# Patient Record
Sex: MALE | Race: WHITE | NOT HISPANIC OR LATINO | Employment: OTHER | URBAN - METROPOLITAN AREA
[De-identification: names, ages, dates, MRNs, and addresses within clinical notes are randomized per-mention and may not be internally consistent; named-entity substitution may affect disease eponyms.]

---

## 2017-04-25 ENCOUNTER — SLEEP CENTER VISIT (OUTPATIENT)
Dept: SLEEP MEDICINE | Facility: MEDICAL CENTER | Age: 72
End: 2017-04-25
Payer: MEDICARE

## 2017-04-25 VITALS
OXYGEN SATURATION: 92 % | SYSTOLIC BLOOD PRESSURE: 128 MMHG | WEIGHT: 295 LBS | BODY MASS INDEX: 44.71 KG/M2 | HEART RATE: 97 BPM | DIASTOLIC BLOOD PRESSURE: 70 MMHG | RESPIRATION RATE: 18 BRPM | HEIGHT: 68 IN

## 2017-04-25 DIAGNOSIS — G47.33 OBSTRUCTIVE SLEEP APNEA: ICD-10-CM

## 2017-04-25 PROCEDURE — 99213 OFFICE O/P EST LOW 20 MIN: CPT | Performed by: INTERNAL MEDICINE

## 2017-04-25 NOTE — PATIENT INSTRUCTIONS
1.  Recommend continuing CPAP at 15 cm water  2. Recommend follow-up in one year  3. Please call when ready to come in for repeat sleep study and we will make the arrangements.

## 2017-04-25 NOTE — MR AVS SNAPSHOT
"        George Stoll   2017 2:40 PM   Sleep Center Visit   MRN: [id number]    Department:  [facility]   Dept Phone:  [phone]    Description:  Male : 1945   Provider:  Denis Manning M.D.           Reason for Visit     Follow-Up GERI      Allergies as of 2017     Allergen Noted Reactions    Sulfa Drugs 2012       Thiazide [Hydrochlorothiazide W-Triamterene] 2012         You were diagnosed with     Obstructive sleep apnea   [999721]         Vital Signs     Blood Pressure Pulse Respirations Height Weight Body Mass Index    128/70 mmHg 97 18 1.727 m (5' 7.99\") 133.811 kg (295 lb) 44.86 kg/m2    Oxygen Saturation Smoking Status                92% Former Smoker          Basic Information     Date Of Birth Sex Race Ethnicity Preferred Language    1945 Male White Non- English      Problem List              ICD-10-CM Priority Class Noted - Resolved    GERI (obstructive sleep apnea) G47.33   2016 - Present    Obesity E66.9   2016 - Present    Hypertension I10   2016 - Present      Health Maintenance        Date Due Completion Dates    IMM DTaP/Tdap/Td Vaccine (1 - Tdap) 3/4/1966 ---    COLONOSCOPY 3/4/1997 ---    IMM ZOSTER VACCINE 3/4/2007 ---    IMM PNEUMOCOCCAL 65+ (ADULT) LOW/MEDIUM RISK SERIES (1 of 2 - PCV13) 3/4/2012 ---            Current Immunizations     Influenza Vaccine Adult HD 2016 11:23 AM, 10/13/2015 11:37 AM      Below and/or attached are the medications your provider expects you to take. Review all of your home medications and newly ordered medications with your provider and/or pharmacist. Follow medication instructions as directed by your provider and/or pharmacist. Please keep your medication list with you and share with your provider. Update the information when medications are discontinued, doses are changed, or new medications (including over-the-counter products) are added; and carry medication information at all times in the " event of emergency situations     Allergies:  SULFA DRUGS - (reactions not documented)     THIAZIDE - (reactions not documented)               Medications  Valid as of: April 25, 2017 -  2:55 PM    Generic Name Brand Name Tablet Size Instructions for use    AmLODIPine Besylate (Tab) NORVASC 5 MG Take 5 mg by mouth every day.        AmLODIPine Besylate (Tab) NORVASC 5 MG Take 1 Tab by mouth every day.        Aspirin (Tab)  MG Take 325 mg by mouth every day.        Doxazosin Mesylate (Tab) CARDURA 2 MG Take 2 mg by mouth every day.        Gabapentin (Cap) NEURONTIN 300 MG Take 300 mg by mouth 3 times a day.        Omega-3 Fatty Acids   Take 1,400 mg by mouth every day.        Omeprazole (CAPSULE DELAYED RELEASE) PRILOSEC 20 MG Take 20 mg by mouth every day.        Zinc (Tab) Zinc 50 MG Take  by mouth every day.        .                 Medicines prescribed today were sent to:     [facility] [id number] [place] [address], [place] - [address] AT [facility] [address] [facility]    [address] [facility] [address]    Phone: [phone] Fax: [phone]    Open 24 Hours?: No      Medication refill instructions:       If your prescription bottle indicates you have medication refills left, it is not necessary to call your provider’s office. Please contact your pharmacy and they will refill your medication.    If your prescription bottle indicates you do not have any refills left, you may request refills at any time through one of the following ways: The online LookIt system (except Urgent Care), by calling your provider’s office, or by asking your pharmacy to contact your provider’s office with a refill request. Medication refills are processed only during regular business hours and may not be available until the next business day. Your provider may request additional information or to have a follow-up visit with you prior to refilling your medication.   *Please Note: Medication refills are assigned a new Rx  number when refilled electronically. Your pharmacy may indicate that no refills were authorized even though a new prescription for the same medication is available at the pharmacy. Please request the medicine by name with the pharmacy before contacting your provider for a refill.           Weotta Access Code: [email or web address]  Expires: 5/25/2017  2:30 PM    Weotta  A secure, online tool to manage your health information     [employer]’s Weotta® is a secure, online tool that connects you to your personalized health information from the privacy of your home -- day or night - making it very easy for you to manage your healthcare. Once the activation process is completed, you can even access your medical information using the Weotta melva, which is available for free in the Apple Melva store or Google Play store.     Weotta provides the following levels of access (as shown below):   My Chart Features   [facility] Doctor [facility]  Specialists [facility]  Urgent  Care [facility]  Primary Care  Doctor   Email your healthcare team securely and privately 24/7 X X X    Manage appointments: schedule your next appointment; view details of past/upcoming appointments X      Request prescription refills. X      View recent personal medical records, including lab and immunizations X X X X   View health record, including health history, allergies, medications X X X X   Read reports about your outpatient visits, procedures, consult and ER notes X X X X   See your discharge summary, which is a recap of your hospital and/or ER visit that includes your diagnosis, lab results, and care plan. X X       How to register for Weotta:  1. Go to  [email or web address].  2. Click on the Sign Up Now box, which takes you to the New Member Sign Up page. You will need to provide the following information:  a. Enter your Weotta Access Code exactly as it appears at the top of this page. (You will not need to use this code after you’ve  completed the sign-up process. If you do not sign up before the expiration date, you must request a new code.)   b. Enter your date of birth.   c. Enter your home email address.   d. Click Submit, and follow the next screen’s instructions.  3. Create a Womplyt ID. This will be your Womplyt login ID and cannot be changed, so think of one that is secure and easy to remember.  4. Create a JumpHawk password. You can change your password at any time.  5. Enter your Password Reset Question and Answer. This can be used at a later time if you forget your password.   6. Enter your e-mail address. This allows you to receive e-mail notifications when new information is available in JumpHawk.  7. Click Sign Up. You can now view your health information.    For assistance activating your JumpHawk account, call [phone]

## 2017-04-25 NOTE — PROGRESS NOTES
George Stoll is a 72 y.o. male here for obstructive sleep apnea.    History of Present Illness:    The patient is a 72-year-old male who has obstructive sleep apnea. He says his last sleep study was about 12 years ago. Nose in 2015. The apnea hypopnea index of 17 and an hour with a low oxygen saturation of 72%. He was originally on 13 cm water now is up to 15 cm of water. He brings in his data card. He feels like he is sleeping well however he is waking up more during the night and he was in the past. He wants to do another sleep study but he just had knee surgery and he wants to wait until his knees have recovered. The data card indicates an average usage of 8 hours a night and an apnea hypopnea index of 0.4 per hour. Otherwise he has no new complaints today.    Constitutional:  Negative for fever, chills, sweats, and fatigue.  Eyes:  Negative for eye pain and visual changes.  HENT:  Negative for tinnitus and hoarse voice.  Cardiovascular:  Negative for chest pain, leg swelling, syncope and orthopnea.  Respiratory:  See HPI for pertinent negatives  Sleep:  Negative for somnolence, loud snoring, sleep disturbance due to breathing, insomnia.  Gastrointestinal:  Negative for dysphagia, nausea and abdominal pain.  Heme/lymph:  Denies easy bruising, blood clots.  Musculoskeletal:  Negative for arthralgias, sore muscles and back pain.  Skin:  Negative for rash and color change.  Neurological:  Negative for headaches, lightheadedness and weakness.  Psychiatric:  Denies depression.    Current Outpatient Prescriptions   Medication Sig Dispense Refill   • gabapentin (NEURONTIN) 300 MG Cap Take 300 mg by mouth 3 times a day.     • omeprazole (PRILOSEC) 20 MG CPDR Take 20 mg by mouth every day.     • doxazosin (CARDURA) 2 MG TABS Take 2 mg by mouth every day.     • Zinc 50 MG TABS Take  by mouth every day.     • Omega-3 Fatty Acids (OMEGA 3 PO) Take 1,400 mg by mouth every day.     • aspirin (ASA) 325 MG TABS Take 325 mg  "by mouth every day.     • amlodipine (NORVASC) 5 MG TABS Take 1 Tab by mouth every day. 90 Each 3   • amlodipine (NORVASC) 5 MG TABS Take 5 mg by mouth every day.       No current facility-administered medications for this visit.       Social History   Substance Use Topics   • Smoking status: Former Smoker -- 0.25 packs/day for 2 years   • Smokeless tobacco: Never Used   • Alcohol Use: No       Past Medical History   Diagnosis Date   • Obesity    • Hypertension    • Sleep apnea        Past Surgical History   Procedure Laterality Date   • Uvulopharyngopalatoplasty     • Tonsillectomy     • Pr remv 2nd cataract,corn-scler sectn     • Arthroscopy, knee         Allergies:  Sulfa drugs and Thiazide    Family History   Problem Relation Age of Onset   • Heart Disease Mother    • Other Brother        Physical Examination    Filed Vitals:    04/25/17 1436   Height: 1.727 m (5' 7.99\")   Weight: 133.811 kg (295 lb)   Weight % change since last entry.: 0 %   BP: 128/70   Pulse: 97   BMI (Calculated): 44.87   Resp: 18       Physical Exam:  Constitutional:  Well developed and well nourished.  Head:  Normocephalic and atraumatic.  Nose:  Nose normal.  Mouth/Throat:  Oropharynx is clear and moist, no lesions.    Neck:  Normal range of motion.  Supple.  No JVD.  Cardiovascular:  Normal rate, regular rhythm, normal heart sounds. No edema  Pulmonary/Chest: No wheezing, rales or rhonchi.  Respiratory effort non labored  Musculoskeletal.  No muscular atrophy.  Lymphadenopathy:  No cervical or supraclavicular adenopathy  Neurological:  Alert and oriented.  Cranial nerves intact.  No focal deficits  Skin:  No rashes or ulcers.  Psyciatric:  Normal mood and affect.    Assessment and Plan:  1. Obstructive sleep apnea  For now the patient will continue with CPAP of 15 cm of water. He is very compliant and per the data card it appears to be efficacious. However the patient wants to come in for a CPAP titration reevaluation study. His last " sleep study was over 12 years ago.  He just wants to do it after his knees have recovered from surgery. He is to give us a call when he is ready to do the CPAP titration. I have put an order in for the study which can be used any calls in to have it scheduled. We will see him back after the sleep study or we'll see him back in one year.      Followup Return in about 1 year (around 4/25/2018) for follow up with the sleep physician, follow up visit with KARLI.

## 2017-05-24 ENCOUNTER — TELEPHONE (OUTPATIENT)
Dept: PULMONOLOGY | Facility: HOSPICE | Age: 72
End: 2017-05-24

## 2017-05-24 DIAGNOSIS — G47.33 OBSTRUCTIVE SLEEP APNEA: ICD-10-CM

## 2018-07-19 ENCOUNTER — SLEEP CENTER VISIT (OUTPATIENT)
Dept: SLEEP MEDICINE | Facility: MEDICAL CENTER | Age: 73
End: 2018-07-19
Payer: MEDICARE

## 2018-07-19 VITALS
WEIGHT: 295 LBS | RESPIRATION RATE: 15 BRPM | SYSTOLIC BLOOD PRESSURE: 118 MMHG | BODY MASS INDEX: 46.3 KG/M2 | DIASTOLIC BLOOD PRESSURE: 74 MMHG | OXYGEN SATURATION: 93 % | HEIGHT: 67 IN | HEART RATE: 85 BPM

## 2018-07-19 DIAGNOSIS — Z87.891 FORMER SMOKER: ICD-10-CM

## 2018-07-19 DIAGNOSIS — G47.33 OSA (OBSTRUCTIVE SLEEP APNEA): ICD-10-CM

## 2018-07-19 DIAGNOSIS — I10 ESSENTIAL HYPERTENSION: ICD-10-CM

## 2018-07-19 DIAGNOSIS — R53.83 FATIGUE, UNSPECIFIED TYPE: ICD-10-CM

## 2018-07-19 PROCEDURE — 99214 OFFICE O/P EST MOD 30 MIN: CPT | Performed by: NURSE PRACTITIONER

## 2018-07-19 ASSESSMENT — ENCOUNTER SYMPTOMS
NEUROLOGICAL NEGATIVE: 1
DIAPHORESIS: 0
MUSCULOSKELETAL NEGATIVE: 1
EYE DISCHARGE: 0
GASTROINTESTINAL NEGATIVE: 1
PSYCHIATRIC NEGATIVE: 1
WEAKNESS: 0
FEVER: 0
CHILLS: 0
EYE PAIN: 0
WEIGHT LOSS: 0
BRUISES/BLEEDS EASILY: 0
RESPIRATORY NEGATIVE: 1
CARDIOVASCULAR NEGATIVE: 1

## 2018-07-19 NOTE — PROGRESS NOTES
Chief Complaint   Patient presents with   • Follow-Up     Annual    • Apnea         HPI: This patient is a 73 y.o. male, who presents for annual follow-up of obstructive sleep apnea.    Patient was last seen April 2017 with Dr. Manning.  Patient has a long-standing history of sleep apnea initially diagnosed in 2005, RDI at that time was 17, minimum saturation 72%.  He has been can compliant with CPAP for many years.  He is currently using CPAP 15 cm H2O. He does not feel his machine is as effective as it once was.  His compliance report shows excellent use, 100% use over the past 30 days, average use 8 hours 41 minutes, AHI of 1.1.  He reports waking up feeling fatigued.  He says he is waking up several times per night occasionally experiences resuscitative gasps.  It is been 13 years since his last study, I did recommend split-night study for requalification purposes and to ensure pressure is adequately titrated.  He is amendable to this.  He denies other changes to his health since he was last seen.    He has a very brief and distant smoking history.    He has a history of hypertension, stable, he is currently asymptomatic denies possible cardiac complaints, remains compliant with Norvasc daily and daily aspirin 325 MG.    Past Medical History:   Diagnosis Date   • Hypertension    • Obesity    • Sleep apnea        Social History   Substance Use Topics   • Smoking status: Former Smoker     Packs/day: 0.25     Years: 2.00   • Smokeless tobacco: Never Used   • Alcohol use No       Family History   Problem Relation Age of Onset   • Heart Disease Mother    • Other Brother    • Sleep Apnea Brother        Immunization History   Administered Date(s) Administered   • Influenza Vaccine Adult HD 10/13/2015, 09/27/2016       Current medications as of today   Current Outpatient Prescriptions   Medication Sig Dispense Refill   • omeprazole (PRILOSEC) 20 MG CPDR Take 20 mg by mouth every day.     • Zinc 50 MG TABS Take  by mouth  "every day.     • aspirin (ASA) 325 MG TABS Take 325 mg by mouth every day.     • amlodipine (NORVASC) 5 MG TABS Take 1 Tab by mouth every day. 90 Each 3   • amlodipine (NORVASC) 5 MG TABS Take 5 mg by mouth every day.       No current facility-administered medications for this visit.        Allergies: Sulfa drugs and Thiazide [hydrochlorothiazide w-triamterene]    Blood pressure 118/74, pulse 85, resp. rate 15, height 1.702 m (5' 7\"), weight (!) 133.8 kg (295 lb), SpO2 93 %.      Review of Systems   Constitutional: Positive for malaise/fatigue. Negative for chills, diaphoresis, fever and weight loss.   HENT: Negative.    Eyes: Negative for pain and discharge.   Respiratory: Negative.    Cardiovascular: Negative.    Gastrointestinal: Negative.    Musculoskeletal: Negative.    Skin: Negative.    Neurological: Negative.  Negative for weakness.   Endo/Heme/Allergies: Negative for environmental allergies. Does not bruise/bleed easily.   Psychiatric/Behavioral: Negative.        Physical Exam   Constitutional: He is oriented to person, place, and time and well-developed, well-nourished, and in no distress.   HENT:   Head: Normocephalic and atraumatic.   Eyes: Pupils are equal, round, and reactive to light.   Neck: Normal range of motion. Neck supple. No tracheal deviation present.   Cardiovascular: Normal rate, regular rhythm and normal heart sounds.    Pulmonary/Chest: Effort normal and breath sounds normal.   Musculoskeletal: Normal range of motion.   Neurological: He is alert and oriented to person, place, and time. Gait normal.   Skin: Skin is warm and dry.   Psychiatric: Mood, memory, affect and judgment normal.   Vitals reviewed.      Diagnoses/Plan:      1. GERI (obstructive sleep apnea)  It has been 13 years since the patient's last sleep study.  He is amendable to split-night study.  He will require study for diagnostic purposes and repeat titration. We will try to complete this in one night. He declines ambien.  " He is due for new CPAP machine which can be ordered after his titration is completed.    - POLYSOMNOGRAPHY, 4 OR MORE; Future    2. Essential hypertension  Stable, patient is asymptomatic and compliant with amlodipine    3. Former smoker  Permanent and complete smoking cessation recommend    4. Fatigue, unspecified type  Possibly related to undertreated sleep apnea.  Should resolve once therapy is titrated.    Follow-up after sleep study to review results, sooner if needed    Follow-up with PCP for routine health maintenance

## 2018-08-02 ENCOUNTER — APPOINTMENT (OUTPATIENT)
Dept: SLEEP MEDICINE | Facility: MEDICAL CENTER | Age: 73
End: 2018-08-02
Attending: NURSE PRACTITIONER
Payer: MEDICARE

## 2018-08-12 ENCOUNTER — SLEEP STUDY (OUTPATIENT)
Dept: SLEEP MEDICINE | Facility: MEDICAL CENTER | Age: 73
End: 2018-08-12
Attending: NURSE PRACTITIONER
Payer: MEDICARE

## 2018-08-12 DIAGNOSIS — G47.33 OSA (OBSTRUCTIVE SLEEP APNEA): ICD-10-CM

## 2018-08-12 PROCEDURE — 95810 POLYSOM 6/> YRS 4/> PARAM: CPT | Performed by: FAMILY MEDICINE

## 2018-08-13 NOTE — PROCEDURES
Clinical Comments:  The patient underwent a comprehensive polysomnogram using the standard montage for measurement of parameters of sleep, respiratory events, movement abnormalities, heart rate and rhythm. A microphone was used to monitor snoring.      INTERPRETATION:  The total recording time was 157.1 minutes with a sleep period of N/A minutes and the total sleep time was 0.0 minutes with a sleep efficiency of 0.0%.  The sleep latency was N/A minutes, and REM latency was N/A minutes.  The patient experienced N/A arousals in total, for an arousal index of N/A    RESPIRATORY: The patient had N/A apneas in total.  Of these, N/A were obstructive apneas, and N/A were central apneas.  This resulted in an apnea index (AI) of N/A.  The patient had N/A hypopneas, for a hypopnea index of N/A.  The overall AHI was N/A, while the AHI during Stage R sleep was N/A.  AHI while supine was N/A.    OXIMETRY: Oxygen saturation monitoring showed a mean SpO2 of 81.8%, with a minimum oxygen saturation of N/A%.  Oxygen saturations were less than or = 89% for 0.0 minutes of sleep time.    CARDIAC: The highest heart rate during the recording was 250.0 beats per minute.  The average heart rate during sleep was N/A bpm.    LIMB MOVEMENTS: There were a total of N/A PLMs during sleep, of which N/A were PLMs arousals.  This resulted in a PLMS index of N/A.    Technical summary:The patient underwent a diagnostic polysomnogram. This was a 16 channel montage study to include a 6 channel EEG, a 2 channel EOG, and chin EMG, left and right leg EMG, a snore channel, a nasal pressure transducer, and a nasal oral airflow semester.   Respiratory effort was assessed with the use of a thoracic and abdominal monitor and overnight oximetry was obtained. Audio and video recordings were reviewed. This was a fully attended study and sleep stage scoring was performed. The test was technically adequate.       Scoring Criteria: A modification of the the AASM  Manual for the Scoring of Sleep and Associated Events, 2012, was used.   Obstructive apnea was scored by cessation of airflow for at least 10 seconds with continuing respiratory effort.  Central apnea was scored by cessation of airflow for at least 10 seconds with no effort.  Hypopnea was scored by a 30% or more reduction in airflow for at least 10 seconds accompanied by an arterial oxygen desaturation of 3% or more.  (For Medicare patients, hypopneas were scored by a 30% or more reduction in airflow for at least 10 seconds accompanied by an arterial oxygen saturation of 4% or more, as required by their insurance, CMS.    General sleep summary:  General sleep summary:  It was ordered as a Split night study. Pt is currently on CPAP 15 cm . During the diagnostic part of the study,he was unable to fall asleep w/o the PAP. He was in bed for 157 min and due to lack of sleep he terminated the study.     Respiratory summary:  Due to lack of sleep no events were observed.    \Periodic limb movement summary: Due to lack of sleep no PLMS were observed      Impression:  1.  Suboptimal study due to lack of sleep onset and maintenance     Recommendations:  Ether repeat split night with sleep aide or consider HST. Clinical correlation is required. In general patients with sleep apnea are advised to avoid alcohol and sedatives and to not operate a motor vehicle while drowsy and are at a greater risk for cardiovascular disease.

## 2018-10-09 ENCOUNTER — HOSPITAL ENCOUNTER (OUTPATIENT)
Dept: HOSPITAL 8 - CVU | Age: 73
Discharge: HOME | End: 2018-10-09
Attending: INTERNAL MEDICINE
Payer: MEDICARE

## 2018-10-09 DIAGNOSIS — E66.01: ICD-10-CM

## 2018-10-09 DIAGNOSIS — I35.8: Primary | ICD-10-CM

## 2018-10-09 DIAGNOSIS — I10: ICD-10-CM

## 2018-10-09 PROCEDURE — 93306 TTE W/DOPPLER COMPLETE: CPT

## 2018-11-05 ENCOUNTER — TELEPHONE (OUTPATIENT)
Dept: SLEEP MEDICINE | Facility: MEDICAL CENTER | Age: 73
End: 2018-11-05

## 2018-11-05 DIAGNOSIS — G47.33 OSA (OBSTRUCTIVE SLEEP APNEA): ICD-10-CM

## 2018-11-05 NOTE — TELEPHONE ENCOUNTER
Patient came in to Sleep Center. He says [facility] never received his sleep study results and orders. Patient requesting information be faxed again.

## 2018-11-06 NOTE — TELEPHONE ENCOUNTER
Advised pt to repeat testing due to lack of sleep.  Pt states he is sleeping fine now, lack of sleep has been due to knee problems that have aida resolved.    He wants to know if he can cxl FV for 11/15?    [place] and needs new mask and supplies sent to DME: [facility] P:[phone]/F:[phone]     Please sign if ok.

## 2018-11-07 NOTE — TELEPHONE ENCOUNTER
He needs to keep F/U to discuss and we may need to order HSS if he needs a new machine in the future.    I will sign order for mask and supplies

## 2018-11-15 ENCOUNTER — SLEEP CENTER VISIT (OUTPATIENT)
Dept: SLEEP MEDICINE | Facility: MEDICAL CENTER | Age: 73
End: 2018-11-15
Payer: MEDICARE

## 2018-11-15 VITALS
BODY MASS INDEX: 48.81 KG/M2 | RESPIRATION RATE: 16 BRPM | OXYGEN SATURATION: 96 % | HEIGHT: 67 IN | TEMPERATURE: 98.2 F | HEART RATE: 89 BPM | DIASTOLIC BLOOD PRESSURE: 74 MMHG | SYSTOLIC BLOOD PRESSURE: 122 MMHG | WEIGHT: 311 LBS

## 2018-11-15 DIAGNOSIS — G47.33 OSA (OBSTRUCTIVE SLEEP APNEA): ICD-10-CM

## 2018-11-15 PROCEDURE — 99213 OFFICE O/P EST LOW 20 MIN: CPT | Performed by: NURSE PRACTITIONER

## 2018-11-15 RX ORDER — MULTIVIT WITH MINERALS/LUTEIN
TABLET ORAL
COMMUNITY

## 2018-11-15 ASSESSMENT — ENCOUNTER SYMPTOMS
BRUISES/BLEEDS EASILY: 0
CARDIOVASCULAR NEGATIVE: 1
NEUROLOGICAL NEGATIVE: 1
EYE DISCHARGE: 0
PSYCHIATRIC NEGATIVE: 1
EYE PAIN: 0
GASTROINTESTINAL NEGATIVE: 1
RESPIRATORY NEGATIVE: 1
CONSTITUTIONAL NEGATIVE: 1

## 2018-11-15 NOTE — PROGRESS NOTES
Chief Complaint   Patient presents with   • Apnea     last seen 7/19/18   • Results     ss 8/12/18         HPI: This patient is a 73 y.o. male, who presents for sleep study result.   To reiterate, patient has a long-standing history of sleep apnea initially diagnosed in 2005, RDI at that time was 17, minimum saturation 72%.  He has been compliant with CPAP for many years.  He is currently using CPAP 15 cm H2O. at his last visit in July he reported very disruptive sleep, waking up frequently at night.  AHI was normal however given symptoms titration study was recommended.  He returned in August for study but was unable to sleep during his study.  He reports he was having significant knee pain which kept him awake all night.  He has been wearing a knee brace at night and reports his symptoms have resolved.  The pain is no longer waking him up.  He is feeling better.  Denies EDS or a.m. headache.  His compliance report today shows 100% compliance, average use 8 hours 15 minutes per night with a normal AHI of 0.7.  He continues to benefit from therapy.  He requests an order for supplies. He will be having knee replacement in Jan.    Past Medical History:   Diagnosis Date   • Hypertension    • Obesity    • Sleep apnea        Social History   Substance Use Topics   • Smoking status: Former Smoker     Packs/day: 0.25     Years: 2.00     Types: Cigarettes     Quit date: 11/15/1968   • Smokeless tobacco: Never Used   • Alcohol use No       Family History   Problem Relation Age of Onset   • Heart Disease Mother    • Other Brother    • Sleep Apnea Brother        Immunization History   Administered Date(s) Administered   • Influenza Vaccine Adult HD 10/13/2015, 09/27/2016, 09/15/2018       Current medications as of today   Current Outpatient Prescriptions   Medication Sig Dispense Refill   • Ascorbic Acid (VITAMIN C) 1000 MG Tab Take  by mouth.     • omeprazole (PRILOSEC) 20 MG CPDR Take 20 mg by mouth every day.     • Zinc 50  "MG TABS Take  by mouth every day.     • aspirin (ASA) 325 MG TABS Take 81 mg by mouth every day.     • amlodipine (NORVASC) 5 MG TABS Take 1 Tab by mouth every day. 90 Each 3   • amlodipine (NORVASC) 5 MG TABS Take 5 mg by mouth every day.       No current facility-administered medications for this visit.        Allergies: Sulfa drugs and Thiazide [hydrochlorothiazide w-triamterene]    Blood pressure 122/74, pulse 89, temperature 36.8 °C (98.2 °F), temperature source Temporal, resp. rate 16, height 1.702 m (5' 7\"), weight (!) 141.1 kg (311 lb), SpO2 96 %.      Review of Systems   Constitutional: Negative.    HENT: Negative.    Eyes: Negative for pain and discharge.   Respiratory: Negative.    Cardiovascular: Negative.    Gastrointestinal: Negative.    Musculoskeletal: Positive for joint pain.   Skin: Negative.    Neurological: Negative.    Endo/Heme/Allergies: Negative for environmental allergies. Does not bruise/bleed easily.   Psychiatric/Behavioral: Negative.        Physical Exam   Constitutional: He is oriented to person, place, and time and well-developed, well-nourished, and in no distress.   HENT:   Head: Normocephalic and atraumatic.   Eyes: Pupils are equal, round, and reactive to light.   Neck: Normal range of motion. Neck supple. No tracheal deviation present.   Pulmonary/Chest: Effort normal.   Musculoskeletal: Normal range of motion. He exhibits no edema.   Neurological: He is alert and oriented to person, place, and time.   Skin: Skin is warm and dry.   Psychiatric: Mood, memory, affect and judgment normal.       Diagnoses/Plan:      1. GERI (obstructive sleep apnea)  Patient has a long-standing history of apnea, and has been compliant with CPAP for many years.  He reports feeling well.  Compliance report shows a normal AHI.  There is no need for additional sleep study at this time.  I provided him with an order for mask and supplies.  He will follow-up here annually, sooner if necessary.  - DME Mask " and Supplies      This dictation was created using voice recognition software. The accuracy of the dictation is limited to the abilities of the software. I expect there may be some errors of grammar and possibly content.

## 2019-05-02 ENCOUNTER — SLEEP CENTER VISIT (OUTPATIENT)
Dept: SLEEP MEDICINE | Facility: MEDICAL CENTER | Age: 74
End: 2019-05-02
Payer: MEDICARE

## 2019-05-02 VITALS
HEART RATE: 79 BPM | TEMPERATURE: 98.8 F | BODY MASS INDEX: 47.56 KG/M2 | OXYGEN SATURATION: 94 % | RESPIRATION RATE: 16 BRPM | HEIGHT: 67 IN | DIASTOLIC BLOOD PRESSURE: 90 MMHG | WEIGHT: 303 LBS | SYSTOLIC BLOOD PRESSURE: 140 MMHG

## 2019-05-02 DIAGNOSIS — I10 ESSENTIAL HYPERTENSION: ICD-10-CM

## 2019-05-02 DIAGNOSIS — Z87.891 FORMER SMOKER, STOPPED SMOKING IN DISTANT PAST: ICD-10-CM

## 2019-05-02 DIAGNOSIS — G47.33 OSA (OBSTRUCTIVE SLEEP APNEA): ICD-10-CM

## 2019-05-02 PROCEDURE — 99214 OFFICE O/P EST MOD 30 MIN: CPT | Performed by: NURSE PRACTITIONER

## 2019-05-02 ASSESSMENT — ENCOUNTER SYMPTOMS
NEUROLOGICAL NEGATIVE: 1
EYE PAIN: 0
PSYCHIATRIC NEGATIVE: 1
CONSTITUTIONAL NEGATIVE: 1
BRUISES/BLEEDS EASILY: 0
GASTROINTESTINAL NEGATIVE: 1
MUSCULOSKELETAL NEGATIVE: 1
EYE DISCHARGE: 0
RESPIRATORY NEGATIVE: 1
CARDIOVASCULAR NEGATIVE: 1

## 2019-05-02 NOTE — PROGRESS NOTES
Chief Complaint   Patient presents with   • Apnea      CPAP 15 cm H2O         HPI: This patient is a 74 y.o. male, who presents for follow-up of GERI with compliance check.     To reiterate, patient has a long-standing history of sleep apnea initially diagnosed in 2005, RDI at that time was 17, minimum saturation 72%.  He has been compliant with CPAP for many years.  He is currently using CPAP 15 cm H2O. His machine will require replacement sometime this year.Compliance download over the past 30 days indicates 100 % compliance, average use of 8 hours 23 minutes per night, AHI of 0.7. Patient reports that a fitting greatly from therapy he denies EDS or a.m. headache.  He feels he gets a better quality sleep with his CPAP device.  He had his left knee replaced in January.  Since then he is healed well without complications.  His knee pain is resolved.    BMI is 47.  Nutrition and exercise counseling performed.  He plays tennis 4 days/week.    He has a brief and distant smoking history quit 1968, no pulmonary complaints.    Past Medical History:   Diagnosis Date   • Hypertension    • Obesity    • Sleep apnea        Social History   Substance Use Topics   • Smoking status: Former Smoker     Packs/day: 0.25     Years: 2.00     Types: Cigarettes     Quit date: 11/15/1968   • Smokeless tobacco: Never Used   • Alcohol use No       Family History   Problem Relation Age of Onset   • Heart Disease Mother    • Other Brother    • Sleep Apnea Brother        Immunization History   Administered Date(s) Administered   • Influenza Vaccine Adult HD 10/13/2015, 09/27/2016, 09/15/2018       Current medications as of today   Current Outpatient Prescriptions   Medication Sig Dispense Refill   • Ascorbic Acid (VITAMIN C) 1000 MG Tab Take  by mouth.     • amlodipine (NORVASC) 5 MG TABS Take 5 mg by mouth every day.     • omeprazole (PRILOSEC) 20 MG CPDR Take 20 mg by mouth every day.     • Zinc 50 MG TABS Take  by mouth every day.     •  aspirin (ASA) 325 MG TABS Take 81 mg by mouth every day.     • amlodipine (NORVASC) 5 MG TABS Take 1 Tab by mouth every day. 90 Each 3     No current facility-administered medications for this visit.        Allergies: Sulfa drugs and Thiazide [hydrochlorothiazide w-triamterene]    There were no vitals taken for this visit.      Review of Systems   Constitutional: Negative.    HENT: Negative.    Eyes: Negative for pain and discharge.   Respiratory: Negative.    Cardiovascular: Negative.    Gastrointestinal: Negative.    Musculoskeletal: Negative.    Skin: Negative.    Neurological: Negative.    Endo/Heme/Allergies: Negative for environmental allergies. Does not bruise/bleed easily.   Psychiatric/Behavioral: Negative.        Physical Exam   Constitutional: He is oriented to person, place, and time and well-developed, well-nourished, and in no distress.   HENT:   Head: Normocephalic and atraumatic.   Eyes: Pupils are equal, round, and reactive to light.   Neck: Normal range of motion. Neck supple. No tracheal deviation present.   Cardiovascular: Normal rate, regular rhythm and normal heart sounds.    Pulmonary/Chest: Effort normal and breath sounds normal.   Musculoskeletal: Normal range of motion.   Neurological: He is alert and oriented to person, place, and time. Gait normal.   Skin: Skin is warm and dry.   Psychiatric: Mood, memory, affect and judgment normal.   Vitals reviewed.      Diagnoses/Plan:    1. GERI (obstructive sleep apnea)   Continue CPAP 15 cm H2O nightly, Clean mask & tubing weekly, Replace supplies as insurance will allow, RX for new supplies and machine to DME  - DME CPAP    2. BMI 45.0-49.9, adult (HCC)  Patient's body mass index is 47.46 kg/m². Exercise and nutrition counseling were performed at this visit.  - Patient identified as having weight management issue.  Appropriate orders and counseling given.    3. Essential hypertension  Is been compliant with Norvasc daily.  Denies cardiac  complaints.  Monitored by his PCP.    4. Former smoker, stopped smoking in distant past  Brief and distant.  Quit in 1968.  No pulmonary complaints.    Follow-up annually, sooner if needed          This dictation was created using voice recognition software. The accuracy of the dictation is limited to the abilities of the software. I expect there may be some errors of grammar and possibly content.

## 2019-07-15 ENCOUNTER — TELEPHONE (OUTPATIENT)
Dept: SLEEP MEDICINE | Facility: MEDICAL CENTER | Age: 74
End: 2019-07-15

## 2019-07-15 DIAGNOSIS — G47.33 OSA (OBSTRUCTIVE SLEEP APNEA): Primary | ICD-10-CM

## 2019-07-15 NOTE — TELEPHONE ENCOUNTER
Patient would like to switch to DME:  [facility] / ph [phone] / fax [phone]    Please sign mask and supply order. Order pended.     Last OV: 05/02/19  Next OV: 09/19/19

## 2019-09-19 ENCOUNTER — SLEEP CENTER VISIT (OUTPATIENT)
Dept: SLEEP MEDICINE | Facility: MEDICAL CENTER | Age: 74
End: 2019-09-19
Payer: MEDICARE

## 2019-09-19 VITALS — SYSTOLIC BLOOD PRESSURE: 130 MMHG | DIASTOLIC BLOOD PRESSURE: 80 MMHG | OXYGEN SATURATION: 92 % | HEART RATE: 87 BPM

## 2019-09-19 DIAGNOSIS — G47.33 OSA (OBSTRUCTIVE SLEEP APNEA): ICD-10-CM

## 2019-09-19 PROCEDURE — 99213 OFFICE O/P EST LOW 20 MIN: CPT | Performed by: NURSE PRACTITIONER

## 2019-09-19 ASSESSMENT — ENCOUNTER SYMPTOMS
NEUROLOGICAL NEGATIVE: 1
CONSTITUTIONAL NEGATIVE: 1
PSYCHIATRIC NEGATIVE: 1
RESPIRATORY NEGATIVE: 1
BRUISES/BLEEDS EASILY: 0
EYE PAIN: 0
CARDIOVASCULAR NEGATIVE: 1
EYE DISCHARGE: 0

## 2019-09-19 NOTE — PROGRESS NOTES
Chief Complaint   Patient presents with   • Apnea     Last Seen 19         HPI: This patient is a 74 y.o. male, who presents for follow-up of GERI with compliance check on new CPAP device.     To reiterate, patient has a long-standing history of sleep apnea initially diagnosed in , RDI at that time was 17, minimum saturation 72%.  He has been compliant with CPAP for many years.  He is currently using CPAP 15 cm H2O. he obtained a new machine after his visit.  He is compliant with CPAP 15 cm H2O. Compliance download over the past 30 days indicates 100 % compliance, average use of 8 hours 42 minutes per night, AHI of 2.2. Patient reports benefiting from therapy.  At times he feels his pressures too low.  This will occur randomly in the middle of the night.  Overall he feels well rested.  Denies EDS or a.m. headache .      Past Medical History:   Diagnosis Date   • Hypertension    • Obesity    • Sleep apnea        Social History     Tobacco Use   • Smoking status: Former Smoker     Packs/day: 0.25     Years: 2.00     Pack years: 0.50     Types: Cigarettes     Last attempt to quit: 11/15/1968     Years since quittin.8   • Smokeless tobacco: Never Used   Substance Use Topics   • Alcohol use: No   • Drug use: No       Family History   Problem Relation Age of Onset   • Heart Disease Mother    • Other Brother    • Sleep Apnea Brother        Immunization History   Administered Date(s) Administered   • Influenza Vaccine Adult HD 10/13/2015, 2016, 09/15/2018       Current medications as of today   Current Outpatient Medications   Medication Sig Dispense Refill   • Ascorbic Acid (VITAMIN C) 1000 MG Tab Take  by mouth.     • omeprazole (PRILOSEC) 20 MG CPDR Take 20 mg by mouth every day.     • Zinc 50 MG TABS Take  by mouth every day.     • aspirin (ASA) 325 MG TABS Take 81 mg by mouth every day.     • amlodipine (NORVASC) 5 MG TABS Take 1 Tab by mouth every day. 90 Each 3     No current facility-administered  medications for this visit.        Allergies: Sulfa drugs and Thiazide [hydrochlorothiazide w-triamterene]    /80 (BP Location: Left arm, Patient Position: Sitting, BP Cuff Size: Large adult)   Pulse 87   SpO2 92%       Review of Systems   Constitutional: Negative.    HENT: Negative.    Eyes: Negative for pain and discharge.   Respiratory: Negative.    Cardiovascular: Negative.    Neurological: Negative.    Endo/Heme/Allergies: Negative for environmental allergies. Does not bruise/bleed easily.   Psychiatric/Behavioral: Negative.        Physical Exam   Constitutional: He is oriented to person, place, and time and well-developed, well-nourished, and in no distress.   HENT:   Head: Normocephalic and atraumatic.   Eyes: Pupils are equal, round, and reactive to light.   Neck: Normal range of motion. Neck supple. No tracheal deviation present.   Pulmonary/Chest: Effort normal.   Musculoskeletal: Normal range of motion.   Neurological: He is alert and oriented to person, place, and time.   Skin: Skin is warm and dry.   Psychiatric: Mood, memory, affect and judgment normal.       Diagnoses/Plan:    1. GERI (obstructive sleep apnea)  Continue CPAP nightly.  I will empirically increase pressure to 16 cm H2O.  He will call and let us know if problem resolves.  He will follow-up annually, sooner if necessary.  - DME Other  - DME Mask and Supplies      This dictation was created using voice recognition software. The accuracy of the dictation is limited to the abilities of the software. I expect there may be some errors of grammar and possibly content.

## 2020-05-14 ENCOUNTER — SLEEP CENTER VISIT (OUTPATIENT)
Dept: SLEEP MEDICINE | Facility: MEDICAL CENTER | Age: 75
End: 2020-05-14
Payer: MEDICARE

## 2020-05-14 VITALS
HEIGHT: 68 IN | HEART RATE: 72 BPM | DIASTOLIC BLOOD PRESSURE: 84 MMHG | OXYGEN SATURATION: 96 % | BODY MASS INDEX: 47.74 KG/M2 | WEIGHT: 315 LBS | RESPIRATION RATE: 14 BRPM | SYSTOLIC BLOOD PRESSURE: 124 MMHG

## 2020-05-14 DIAGNOSIS — G47.33 OSA (OBSTRUCTIVE SLEEP APNEA): ICD-10-CM

## 2020-05-14 PROCEDURE — 99213 OFFICE O/P EST LOW 20 MIN: CPT | Performed by: FAMILY MEDICINE

## 2020-05-14 NOTE — PROGRESS NOTES
"   [facility] Follow Up Note     Date: 5/14/2020 / Time: 8:25 AM    Patient ID:   Name:             George Stoll     YOB: 1945  Age:                 75 y.o.  male   MRN:               [id number]      Thank you for requesting a sleep medicine consultation on George Stoll at the sleep center. He presents today with the chief complaints of GERI follow up.     HISTORY OF PRESENT ILLNESS:       Pt is currently on CPAP 15 cm. He goes to sleep around 10 pm-1 am and wakes up around 6-7 am. He is getting about 7 hrs of sleep on a good night and about 6 hr of sleep on a bad night. The bad nights are few per month. Overall,  He does  finds his sleep refreshing.He does take regular naps. The naps are usually 60 min long. Denies sleep disorder like parasomnias, nightmares and night terrors. However his sleep is sometimes disturbed due to RLS/neuropathy. He has hx of multiple LE surgeries . He has tried lyrica and gabapentin in past and as per pt it \"messed him up\".     He is using CPAP most days of the week. Pt reports 8 hrs of average nightly use of CPAP. Pt denies snoring, gasping,choking.Pt also denies significant mask leak that is interfering with sleep. The 30 day compliance was downloaded which shows adequate compliance with more that 4 hr usage about 100%. The AHI is has improved to 1.9/hr. The mask leak is normal The symptoms of excessive daytime, snoring and gasping has improved with the CPAP.         SLEEP HISTORY   Diagnosed in 2005, RDI at that time was 17, minimum saturation 72%.        REVIEW OF SYSTEMS:       Constitutional: Denies fevers, Denies weight changes  Eyes: Denies changes in vision, no eye pain  Ears/Nose/Throat/Mouth: Denies nasal congestion or sore throat   Cardiovascular: Denies chest pain or palpitations   Respiratory: Denies shortness of breath , Denies cough  Gastrointestinal/Hepatic: Denies abdominal pain, nausea, vomiting  Musculoskeletal/Rheum: Denies  " "joint pain and swelling   Skin/Breast: Denies rash,   Neurological: Denies headache, confusion, memory loss or focal weakness/parasthesias  Psychiatric: denies mood disorder   Sleep: denies snoring     Comprehensive review of systems form is reviewed with the patient and is attached in the EMR.     PMH:  has a past medical history of Hypertension, Obesity, and Sleep apnea.  MEDS:   Current Outpatient Medications:   •  Ascorbic Acid (VITAMIN C) 1000 MG Tab, Take  by mouth., Disp: , Rfl:   •  omeprazole (PRILOSEC) 20 MG CPDR, Take 20 mg by mouth every day., Disp: , Rfl:   •  Zinc 50 MG TABS, Take  by mouth every day., Disp: , Rfl:   •  aspirin (ASA) 325 MG TABS, Take 81 mg by mouth every day., Disp: , Rfl:   •  amlodipine (NORVASC) 5 MG TABS, Take 1 Tab by mouth every day., Disp: 90 Each, Rfl: 3  ALLERGIES:   Allergies   Allergen Reactions   • Sulfa Drugs    • Thiazide [Hydrochlorothiazide W-Triamterene]      SURGHX:   Past Surgical History:   Procedure Laterality Date   • ARTHROSCOPY, KNEE     • PB REMV 2ND CATARACT,CORN-SCLER SECTN     • TONSILLECTOMY     • UVULOPHARYNGOPALATOPLASTY       SOCHX:  reports that he quit smoking about 51 years ago. His smoking use included cigarettes. He has a 0.50 pack-year smoking history. He has never used smokeless tobacco. He reports that he does not drink alcohol or use drugs..  FH:   Family History   Problem Relation Age of Onset   • Heart Disease Mother    • Other Brother    • Sleep Apnea Brother          Physical Exam:  Vitals/ General Appearance:   Weight/BMI: Body mass index is 48.35 kg/m².  /84 (BP Location: Left arm, Patient Position: Sitting, BP Cuff Size: Adult)   Pulse 72   Resp 14   Ht 1.727 m (5' 8\")   Wt (!) 144.2 kg (318 lb)   SpO2 96%   Vitals:    05/14/20 0815   BP: 124/84   BP Location: Left arm   Patient Position: Sitting   BP Cuff Size: Adult   Pulse: 72   Resp: 14   SpO2: 96%   Weight: (!) 144.2 kg (318 lb)   Height: 1.727 m (5' 8\")       Pt. is " alert and oriented to time, place and person. Cooperative and in no apparent distress.       -Head: Atraumatic, normocephalic.   -. Throat: Oropharynx appears crowded that the palate is overhanging   -. Neck: Supple. No thyromegaly  -. Chest: Trachea central,   -. Lungs auscultation: B/L good air entry, vesicular breath sounds, no adventitious sounds  -. Heart auscultation: 1st and 2nd heart sounds normal, regular rhythm. No appreciable murmur.  - Extremities:  no pedal edema.  - Skin: No rash  - NEUROLOGICAL EXAMINATION: On neurological exam, the patient was alert and oriented x3. speech was clear and fluent without dysarthria.      ASSESSMENT AND PLAN     1. Sleep Apnea    The pathophysiology of sleep anea and the increased risk of cardiovascular morbidity from untreated sleep apnea is discussed in detail with the patient.     He is urged to avoid supine sleep, weight gain and alcoholic beverages since all of these can worsen sleep apnea. He is cautioned against drowsy driving. If He feels sleepy while driving, He must pull over for a break/nap, rather than persist on the road, in the interest of He own safety and that of others on the road.   Plan   - Continue ACPAP 15 cm with nadsl mask    - supplies are refilled    - compliance download was reviewed and discussed with the pt   - compliance was reinforced     2. Regarding treatment of other past medical problems and general health maintenance,  He is urged to follow up with PCP.

## 2021-01-15 DIAGNOSIS — Z23 NEED FOR VACCINATION: ICD-10-CM

## 2021-05-27 ENCOUNTER — OFFICE VISIT (OUTPATIENT)
Dept: SLEEP MEDICINE | Facility: MEDICAL CENTER | Age: 76
End: 2021-05-27
Payer: MEDICARE

## 2021-05-27 VITALS
OXYGEN SATURATION: 95 % | DIASTOLIC BLOOD PRESSURE: 86 MMHG | SYSTOLIC BLOOD PRESSURE: 128 MMHG | WEIGHT: 315 LBS | HEIGHT: 68 IN | BODY MASS INDEX: 47.74 KG/M2 | HEART RATE: 89 BPM

## 2021-05-27 DIAGNOSIS — I10 ESSENTIAL HYPERTENSION: ICD-10-CM

## 2021-05-27 DIAGNOSIS — G47.33 OSA (OBSTRUCTIVE SLEEP APNEA): ICD-10-CM

## 2021-05-27 DIAGNOSIS — Z87.891 FORMER SMOKER: ICD-10-CM

## 2021-05-27 PROCEDURE — 99214 OFFICE O/P EST MOD 30 MIN: CPT | Performed by: NURSE PRACTITIONER

## 2021-05-27 NOTE — PROGRESS NOTES
Chief Complaint   Patient presents with   • Follow-Up     GERI. Last seen 20       HPI:  George Stoll is a 76 y.o. year old male here today for follow-up on GERI.  Last OV 20 with Dr. Norris    To reiterate, patient has a long-standing history of sleep apnea initially diagnosed in , RDI at that time was 17, minimum saturation 72%.  He has been compliant with CPAP for many years.  He is currently using CPAP 15 cm H2O.  Device obtained .  Compliance 21-21 notes 100% compliance, avg nightly use of 8hr 41min, no mask leak with reduced AHi 1.6/hr. Reviewed findings with patient. He tolerates mask and pressure well; no am headaches. He notes consistent daytime energy levels and continues to play tennis 3-4x's per week. He would like to lose weight. He denies cardiac or respiratory symptoms. No major changes in health over the last year.      ROS: As per HPI and otherwise negative if not stated.    Past Medical History:   Diagnosis Date   • Hypertension    • Obesity    • Sleep apnea        Past Surgical History:   Procedure Laterality Date   • ARTHROSCOPY, KNEE     • PB REMV 2ND CATARACT,CORN-SCLER SECTN     • TONSILLECTOMY     • UVULOPHARYNGOPALATOPLASTY         Family History   Problem Relation Age of Onset   • Heart Disease Mother    • Other Brother    • Sleep Apnea Brother        Social History     Socioeconomic History   • Marital status:      Spouse name: Not on file   • Number of children: Not on file   • Years of education: Not on file   • Highest education level: Not on file   Occupational History   • Not on file   Tobacco Use   • Smoking status: Former Smoker     Packs/day: 0.25     Years: 2.00     Pack years: 0.50     Types: Cigarettes     Quit date: 11/15/1968     Years since quittin.5   • Smokeless tobacco: Never Used   Vaping Use   • Vaping Use: Never used   Substance and Sexual Activity   • Alcohol use: No   • Drug use: No   • Sexual activity: Not on file   Other  "Topics Concern   • Not on file   Social History Narrative   • Not on file     Social Determinants of Health     Financial Resource Strain:    • Difficulty of Paying Living Expenses:    Food Insecurity:    • Worried About Running Out of Food in the Last Year:    • Ran Out of Food in the Last Year:    Transportation Needs:    • Lack of Transportation (Medical):    • Lack of Transportation (Non-Medical):    Physical Activity:    • Days of Exercise per Week:    • Minutes of Exercise per Session:    Stress:    • Feeling of Stress :    Social Connections:    • Frequency of Communication with Friends and Family:    • Frequency of Social Gatherings with Friends and Family:    • Attends [employer] Services:    • Active Member of Clubs or Organizations:    • Attends Club or Organization Meetings:    • Marital Status:    Intimate Partner Violence:    • Fear of Current or Ex-Partner:    • Emotionally Abused:    • Physically Abused:    • Sexually Abused:        Allergies as of 05/27/2021 - Reviewed 05/27/2021   Allergen Reaction Noted   • Sulfa drugs  05/24/2012   • Thiazide [hydrochlorothiazide w-triamterene]  05/24/2012        Vitals:  /86 (BP Location: Left arm, Patient Position: Sitting, BP Cuff Size: Large adult)   Pulse 89   Ht 1.727 m (5' 8\")   Wt (!) 146 kg (321 lb 2 oz)   SpO2 95%     Current medications as of today   Current Outpatient Medications   Medication Sig Dispense Refill   • Ascorbic Acid (VITAMIN C) 1000 MG Tab Take  by mouth.     • omeprazole (PRILOSEC) 20 MG CPDR Take 20 mg by mouth every day.     • Zinc 50 MG TABS Take  by mouth every day.     • aspirin (ASA) 325 MG TABS Take 81 mg by mouth every day.     • amlodipine (NORVASC) 5 MG TABS Take 1 Tab by mouth every day. 90 Each 3     No current facility-administered medications for this visit.         Physical Exam:   Gen:           Alert and oriented, No apparent distress. Mood and affect appropriate, normal interaction with examiner.  Eyes:        "   PERRL, EOM intact, sclere white, conjunctive moist. Glasses.  Ears:          Not examined.   Hearing:     Grossly intact.  Nose:          Normal, no lesions or deformities.  Dentition:    mask  Oropharynx:   mask  Mallampati Classification: mask  Neck:        Supple, trachea midline, no masses.  Respiratory Effort: No intercostal retractions or use of accessory muscles.   Lung Auscultation:      Clear to auscultation bilaterally; no rales, rhonchi or wheezing.  CV:            Regular rate and rhythm. No murmurs, rubs or gallops.  Abd:           Not examined.   Lymphadenopathy: Not examined.  Gait and Station: Normal.  Digits and Nails: No clubbing, cyanosis, petechiae, or nodes.   Cranial Nerves: II-XII grossly intact.  Skin:        No rashes, lesions or ulcers noted.               Ext:           No cyanosis or edema.      Assessment:  1. GERI (obstructive sleep apnea)     2. BMI 45.0-49.9, adult (HCC)  Height And Weight   3. Essential hypertension     4. Former smoker         Immunizations:    Flu:recommend  Pneumovax 23:not due  Prevnar 13:2019  COVID-19: recommend    Plan:  1. Continue CPAP nightly; he will continue to benefit from therapy.  DME mask/supplies.  2. Discussed sleep hygiene  3. Encouraged weight loss through diet/exercise; declines HIP referral  4. F/u with PCP for other health concerns  5. F/u in 1 year with compliance report, sooner if needed.    Please note that this dictation was created using voice recognition software. I have made every reasonable attempt to correct obvious errors, but it is possible there are errors of grammar and possibly content that I did not discover before finalizing the note.

## 2021-08-11 ENCOUNTER — HOSPITAL ENCOUNTER (OUTPATIENT)
Dept: HOSPITAL 8 - CVU | Age: 76
Discharge: HOME | End: 2021-08-11
Attending: INTERNAL MEDICINE
Payer: MEDICARE

## 2021-08-11 DIAGNOSIS — I11.9: ICD-10-CM

## 2021-08-11 DIAGNOSIS — I08.3: Primary | ICD-10-CM

## 2021-08-11 PROCEDURE — C8929 TTE W OR WO FOL WCON,DOPPLER: HCPCS

## 2022-06-13 ENCOUNTER — OFFICE VISIT (OUTPATIENT)
Dept: SLEEP MEDICINE | Facility: MEDICAL CENTER | Age: 77
End: 2022-06-13
Payer: MEDICARE

## 2022-06-13 VITALS
HEART RATE: 79 BPM | OXYGEN SATURATION: 95 % | WEIGHT: 298 LBS | HEIGHT: 68 IN | BODY MASS INDEX: 45.16 KG/M2 | DIASTOLIC BLOOD PRESSURE: 78 MMHG | SYSTOLIC BLOOD PRESSURE: 132 MMHG

## 2022-06-13 DIAGNOSIS — Z87.891 FORMER SMOKER: ICD-10-CM

## 2022-06-13 DIAGNOSIS — G47.33 OSA (OBSTRUCTIVE SLEEP APNEA): ICD-10-CM

## 2022-06-13 DIAGNOSIS — I10 PRIMARY HYPERTENSION: ICD-10-CM

## 2022-06-13 PROCEDURE — 99213 OFFICE O/P EST LOW 20 MIN: CPT | Performed by: NURSE PRACTITIONER

## 2022-06-13 NOTE — PROGRESS NOTES
Chief Complaint   Patient presents with   • Apnea     GERI-Last seen 05/27/2021       HPI:  George Stoll is a 77 y.o. year old male here today for follow-up on GERI.  Last OV 5/27/21     Currently using CPAP @ 15cm H20 nightly; [facility]; device obtained 2019.  Device is currently register for recall and awaiting replacement device.  He returns today indicating that his device is periodically turning off throughout the night which initially started 1 year ago.  In the last 2 weeks has been happening every 1 hour.  He notes the pressure to drop when he wakes up out of breath.  Previously he took device to his DME and they were able to clean and replace filters which improved the function of the device.    Compliance report 5/10/2022 through 6/8/2022 indicates 100% compliance, average daily use 8 hours 14 minutes, no significant mask leak with a reduced AHI of 2.5/h.  Reviewed findings with patient.  He would like to have his device fixed.  He is currently using his older device every night and continues to sleep well.  He denies any significant cardiac or respiratory symptoms today.    Sleep hx:  To reiterate, patient has a long-standing history of sleep apnea initially diagnosed in 2005, RDI at that time was 17, minimum saturation 72%.  He has been compliant with CPAP for many years.  He is currently using CPAP 15 cm H2O.  Device obtained 2019.      ROS: As per HPI and otherwise negative if not stated.    Past Medical History:   Diagnosis Date   • Hypertension    • Obesity    • Sleep apnea        Past Surgical History:   Procedure Laterality Date   • ARTHROSCOPY, KNEE     • [place] REMV 2ND CATARACT,CORN-SCLER SECTN     • TONSILLECTOMY     • UVULOPHARYNGOPALATOPLASTY         Family History   Problem Relation Age of Onset   • Heart Disease Mother    • Other Brother    • Sleep Apnea Brother        Social History     Socioeconomic History   • Marital status:      Spouse name: Not on file   • Number of children:  "Not on file   • Years of education: Not on file   • Highest education level: Not on file   Occupational History   • Not on file   Tobacco Use   • Smoking status: Former Smoker     Packs/day: 0.25     Years: 2.00     Pack years: 0.50     Types: Cigarettes     Quit date: 11/15/1968     Years since quittin.6   • Smokeless tobacco: Never Used   Vaping Use   • Vaping Use: Never used   Substance and Sexual Activity   • Alcohol use: No   • Drug use: No   • Sexual activity: Not on file   Other Topics Concern   • Not on file   Social History Narrative   • Not on file     Social Determinants of Health     Financial Resource Strain: Not on file   Food Insecurity: Not on file   Transportation Needs: Not on file   Physical Activity: Not on file   Stress: Not on file   Social Connections: Not on file   Intimate Partner Violence: Not on file   Housing Stability: Not on file       Allergies as of 2022 - Reviewed 2022   Allergen Reaction Noted   • Hydrochlorothiazide Unspecified 2016   • Sulfa drugs  2012   • Thiazide [hydrochlorothiazide w-triamterene]  2012        Vitals:  /78 (BP Location: Left arm, Patient Position: Sitting, BP Cuff Size: Large adult)   Pulse 79   Ht 1.727 m (5' 8\")   Wt (!) 135 kg (298 lb)   SpO2 95%     Current medications as of today   Current Outpatient Medications   Medication Sig Dispense Refill   • Ascorbic Acid (VITAMIN C) 1000 MG Tab Take  by mouth.     • omeprazole (PRILOSEC) 20 MG delayed-release capsule Take 20 mg by mouth every day.     • Zinc 50 MG TABS Take  by mouth every day.     • aspirin (ASA) 325 MG TABS Take 81 mg by mouth every day.     • amlodipine (NORVASC) 5 MG TABS Take 1 Tab by mouth every day. 90 Each 3     No current facility-administered medications for this visit.         Physical Exam:   Gen:           Alert and oriented, No apparent distress. Mood and affect appropriate, normal interaction with examiner.  Eyes:          PERRL, EOM " intact, sclere white, conjunctive moist.  Ears:          Not examined.   Hearing:     Grossly intact.  Nose:          Normal, no lesions or deformities.  Dentition:    mask  Oropharynx:   mask  Mallampati Classification: mask  Neck:        Supple, trachea midline, no masses.  Respiratory Effort: No intercostal retractions or use of accessory muscles.   Lung Auscultation:      Clear to auscultation bilaterally; no rales, rhonchi or wheezing.  CV:            Regular rate and rhythm. 1/6 systolic murmur - under eval by cardio.  Abd:           Not examined.   Lymphadenopathy: Not examined.  Gait and Station: Normal.  Digits and Nails: No clubbing, cyanosis, petechiae, or nodes.   Cranial Nerves: II-XII grossly intact.  Skin:        No rashes, lesions or ulcers noted.               Ext:           No cyanosis or edema.      Assessment:  1. GERI (obstructive sleep apnea)  DME CPAP    DME Mask and Supplies   2. Primary hypertension     3. BMI 45.0-49.9, adult (HCC)  HEIGHT AND WEIGHT   4. Former smoker         Immunizations:    Flu:recommend in fall  Pneumovax 23:recommend  Prevnar 13:2019  COVID-19: recommend,     Plan:  1.  GERI is currently under control due to patient using his older device.  His current device appears to be malfunctioning.  Recommend evaluation by DME and possible replacement.  He will continue to benefit from nightly therapy.  DME CPAP; replace/repair  DME mask/supplies  2.  Discussed sleep hygiene  3.  Encourage further weight loss through dietary changes and exercise  4.  Follow-up with cardiology for ongoing evaluation of systolic murmur and management hypertension  5.  Follow-up in 3 months to review therapy, sooner if needed.    Please note that this dictation was created using voice recognition software. I have made every reasonable attempt to correct obvious errors, but it is possible there are errors of grammar and possibly content that I did not discover before finalizing the note.

## 2022-06-13 NOTE — PATIENT INSTRUCTIONS
Take device to DME:  [facility] / ph [phone] / fax [phone]  To evaluate  Will fax orders to have device replaced

## 2022-09-02 ENCOUNTER — TELEPHONE (OUTPATIENT)
Dept: SLEEP MEDICINE | Facility: MEDICAL CENTER | Age: 77
End: 2022-09-02
Payer: MEDICARE

## 2022-09-02 NOTE — TELEPHONE ENCOUNTER
Pt called and left vm stating that he received a 900 dollar bill for his new CPAP that he received from [facility], but stated that he didn't want to pay that. Called [facility] and spoke to Warren and she informed that Medicare denied the order as its only been 4 years since he last received a machine through them and not 5 years. Warren understood that the order was placed as his current machine is broken and also under recall so she has appealed the denial but advised the pt to return the machine and she would write off the bill and give him a loaner until we are able to resubmit next year when he is due or she hears back from medicare about the appeal but she stated pt denied returning the machine to her. I called pt to reiterate what she has offered him and also advised if he did not return the machine that he would be responsible for that 900 dollar bill. Pt relayed understanding and advised he would call Warren to get this taken care of and get the machine back to her.

## 2022-09-13 ENCOUNTER — OFFICE VISIT (OUTPATIENT)
Dept: SLEEP MEDICINE | Facility: MEDICAL CENTER | Age: 77
End: 2022-09-13
Payer: MEDICARE

## 2022-09-13 VITALS
HEIGHT: 68 IN | SYSTOLIC BLOOD PRESSURE: 130 MMHG | RESPIRATION RATE: 16 BRPM | DIASTOLIC BLOOD PRESSURE: 76 MMHG | OXYGEN SATURATION: 98 % | BODY MASS INDEX: 45.31 KG/M2 | WEIGHT: 299 LBS | HEART RATE: 79 BPM

## 2022-09-13 DIAGNOSIS — Z23 NEED FOR VACCINATION: ICD-10-CM

## 2022-09-13 DIAGNOSIS — Z87.891 FORMER SMOKER: ICD-10-CM

## 2022-09-13 DIAGNOSIS — G47.33 OBSTRUCTIVE SLEEP APNEA: ICD-10-CM

## 2022-09-13 DIAGNOSIS — I10 PRIMARY HYPERTENSION: ICD-10-CM

## 2022-09-13 PROBLEM — I71.20 THORACIC AORTIC ANEURYSM (TAA) (HCC): Status: ACTIVE | Noted: 2022-08-15

## 2022-09-13 PROCEDURE — 90662 IIV NO PRSV INCREASED AG IM: CPT | Performed by: NURSE PRACTITIONER

## 2022-09-13 PROCEDURE — G0008 ADMIN INFLUENZA VIRUS VAC: HCPCS | Performed by: NURSE PRACTITIONER

## 2022-09-13 PROCEDURE — 99213 OFFICE O/P EST LOW 20 MIN: CPT | Mod: 25 | Performed by: NURSE PRACTITIONER

## 2022-09-13 ASSESSMENT — PATIENT HEALTH QUESTIONNAIRE - PHQ9: CLINICAL INTERPRETATION OF PHQ2 SCORE: 0

## 2022-09-13 NOTE — PATIENT INSTRUCTIONS
Complete overnight oximetry through DME:  [facility] / ph [phone] / fax [phone]  Call to schedule test

## 2022-09-13 NOTE — PROGRESS NOTES
Chief Complaint   Patient presents with    Follow-Up     last seen 6/13/2022        HPI:  George Stoll is a 77 y.o. year old male here today for follow-up on GERI.  Last OV 6/13/22     His device at last OV was not working properly and new device ordered.  He received a new device from [facility].  He likes his new device and working well.  He was recently diagnosed with thoracic aortic aneurysm managed by Dr. Garcia.  Echo 8/8/2022 indicated EF 70%, grade 1 diastolic dysfunction, severe left atrial enlargement, mild right atrial enlargement, small to right PFO shunt, sinus of Valsalva dilated 4.2 cm, ascending aorta 4.2 cm, RVSP 31 mmHg.  Compared to 8/11/2021 study slight increase in aortic valve velocity and gradients.  Moderate aortic stenosis and now severe left atrial stenosis present.    Currently using CPAP@15 cm H20 nightly; [facility]; device obtained 2022.  Compliance report 8/14/2022 through 9/12/2022 indicates 100% compliance, average nightly use 8 hours 30 minutes, minimal mask leak with an overall AHI 0.4/h.  Reviewed with patient.  He tolerates mask and pressure well.  He denies morning headaches.  He denies daytime sleepiness or napping.  He feels overall he sleeping very well in therapy.  He denies cardiac or respiratory symptoms today.    He plans on wintering in [place].    Sleep hx:  To reiterate, patient has a long-standing history of sleep apnea initially diagnosed in 2005, RDI at that time was 17, minimum saturation 72%.  He has been compliant with CPAP for many years.  He is currently using CPAP 15 cm H2O.  Device obtained 2019.      ROS: As per HPI and otherwise negative if not stated.    Past Medical History:   Diagnosis Date    Hypertension     Obesity     Sleep apnea        Past Surgical History:   Procedure Laterality Date    ARTHROSCOPY, KNEE      [place] REMV 2ND CATARACT,CORN-SCLER SECTN      TONSILLECTOMY      UVULOPHARYNGOPALATOPLASTY         Family History   Problem Relation Age  "of Onset    Heart Disease Mother     Other Brother     Sleep Apnea Brother        Social History     Socioeconomic History    Marital status:      Spouse name: Not on file    Number of children: Not on file    Years of education: Not on file    Highest education level: Not on file   Occupational History    Not on file   Tobacco Use    Smoking status: Former     Packs/day: 0.25     Years: 2.00     Pack years: 0.50     Types: Cigarettes     Quit date: 11/15/1968     Years since quittin.8    Smokeless tobacco: Never   Vaping Use    Vaping Use: Never used   Substance and Sexual Activity    Alcohol use: No    Drug use: No    Sexual activity: Not on file   Other Topics Concern    Not on file   Social History Narrative    Not on file     Social Determinants of Health     Financial Resource Strain: Not on file   Food Insecurity: Not on file   Transportation Needs: Not on file   Physical Activity: Not on file   Stress: Not on file   Social Connections: Not on file   Intimate Partner Violence: Not on file   Housing Stability: Not on file       Allergies as of 2022 - Reviewed 2022   Allergen Reaction Noted    Hydrochlorothiazide Unspecified 2016    Sulfa drugs  2012    Thiazide [hydrochlorothiazide w-triamterene]  2012        Vitals:  /76 (BP Location: Left arm, Patient Position: Sitting, BP Cuff Size: Adult)   Pulse 79   Resp 16   Ht 1.727 m (5' 8\")   Wt (!) 136 kg (299 lb)   SpO2 98%     Current medications as of today   Current Outpatient Medications   Medication Sig Dispense Refill    Ascorbic Acid (VITAMIN C) 1000 MG Tab Take  by mouth.      omeprazole (PRILOSEC) 20 MG delayed-release capsule Take 20 mg by mouth every day.      Zinc 50 MG TABS Take  by mouth every day.      aspirin (ASA) 325 MG TABS Take 81 mg by mouth every day.      amlodipine (NORVASC) 5 MG TABS Take 1 Tab by mouth every day. 90 Each 3     No current facility-administered medications for this visit. "         Physical Exam:   Gen:           Alert and oriented, No apparent distress. Mood and affect appropriate, normal interaction with examiner.  Eyes:          PERRL, EOM intact, sclere white, conjunctive moist. Glasses.  Ears:          Not examined.   Hearing:     Grossly intact.  Nose:          Normal, no lesions or deformities.  Dentition:    mask  Oropharynx:   mask  Mallampati Classification: mask  Neck:        Supple, trachea midline, no masses.  Respiratory Effort: No intercostal retractions or use of accessory muscles.   Lung Auscultation:      Clear to auscultation bilaterally; no rales, rhonchi or wheezing.  CV:            Regular rate and rhythm. No murmurs, rubs or gallops.  Abd:           Not examined.   Lymphadenopathy: Not examined.  Gait and Station: Normal.  Digits and Nails: No clubbing, cyanosis, petechiae, or nodes.   Cranial Nerves: II-XII grossly intact.  Skin:        No rashes, lesions or ulcers noted.               Ext:           BLE 1+ pitting edema on shin, R>L      Assessment:  1. Obstructive sleep apnea        2. Primary hypertension        3. BMI 45.0-49.9, adult (HCC)  HEIGHT AND WEIGHT      4. Former smoker            Immunizations:    Flu:given today  Pneumovax 23:not due  Prevnar 13:2019  PCV 20: due, but not available  COVID-19: recommend    Plan:  GERI is well controlled therapy.  He will continue to benefit from CPAP 15 cm nightly.  Due to his history of hypertension and thoracic aortic valve enlargement that is now severe recommend ongoing treatment with CPAP nightly.  Is medically necessary to treat this with the patient.  Continue CPAP 15 cm nightly  DME CNOX on Pap now; patient will call for results.  Pain results make empirical pressure adjustment versus evaluate need for supplemental O2.  Follow-up with primary care and cardiology for ongoing management hypertension  Encourage weight loss through healthy eating regular activity  Influenza vaccination given today  Follow-up  in 1 year with compliance poor, sooner if needed.    Please note that this dictation was created using voice recognition software. I have made every reasonable attempt to correct obvious errors, but it is possible there are errors of grammar and possibly content that I did not discover before finalizing the note.

## 2023-07-05 ENCOUNTER — OFFICE VISIT (OUTPATIENT)
Dept: SLEEP MEDICINE | Facility: MEDICAL CENTER | Age: 78
End: 2023-07-05
Attending: NURSE PRACTITIONER
Payer: MEDICARE

## 2023-07-05 VITALS
SYSTOLIC BLOOD PRESSURE: 130 MMHG | HEART RATE: 75 BPM | HEIGHT: 68 IN | DIASTOLIC BLOOD PRESSURE: 80 MMHG | BODY MASS INDEX: 45.31 KG/M2 | OXYGEN SATURATION: 94 % | RESPIRATION RATE: 16 BRPM | WEIGHT: 299 LBS

## 2023-07-05 DIAGNOSIS — G47.33 OSA (OBSTRUCTIVE SLEEP APNEA): ICD-10-CM

## 2023-07-05 DIAGNOSIS — Z87.891 FORMER SMOKER: ICD-10-CM

## 2023-07-05 DIAGNOSIS — Z23 NEED FOR VACCINATION: ICD-10-CM

## 2023-07-05 DIAGNOSIS — I10 PRIMARY HYPERTENSION: ICD-10-CM

## 2023-07-05 PROCEDURE — 90677 PCV20 VACCINE IM: CPT

## 2023-07-05 PROCEDURE — 99212 OFFICE O/P EST SF 10 MIN: CPT | Performed by: NURSE PRACTITIONER

## 2023-07-05 PROCEDURE — 3075F SYST BP GE 130 - 139MM HG: CPT | Performed by: NURSE PRACTITIONER

## 2023-07-05 PROCEDURE — 3079F DIAST BP 80-89 MM HG: CPT | Performed by: NURSE PRACTITIONER

## 2023-07-05 PROCEDURE — 99213 OFFICE O/P EST LOW 20 MIN: CPT | Performed by: NURSE PRACTITIONER

## 2023-07-05 RX ORDER — FUROSEMIDE 40 MG/1
TABLET ORAL
COMMUNITY

## 2023-07-05 RX ORDER — LOSARTAN POTASSIUM 100 MG/1
TABLET ORAL
COMMUNITY

## 2023-07-05 ASSESSMENT — PATIENT HEALTH QUESTIONNAIRE - PHQ9: CLINICAL INTERPRETATION OF PHQ2 SCORE: 0

## 2023-07-05 NOTE — PROGRESS NOTES
Chief Complaint   Patient presents with    Follow-Up     Apnea //last seen 9/13/2022    Results     OPO 9/19/2022        HPI:  George Stoll is a 78 y.o. year old male here today for follow-up on GERI.  Last OV 9/13/22     DME CNOX 9/19/2022 indicates mean SPO2 91.3%, O2 ely 87%, less than 88% for 32 seconds of the night.  Overall adequate oxygenation.  Continue current settings.  Reviewed with patient.    Patient obtained replacement device from recall since last office visit.    He also had cardiology perform implant placement in [place] November 2022.  He notes no chest pain, chest tightness or shortness of breath.  He is pending follow-up with cardiology regarding this.    Currently using CPAP @ 15cm H20 nightly; [facility]; device obtained 2022 out of pocket. He also has replacement Respironics device from recall now as well.  Compliance report 6/5/2023 through 7/4/2023 indicates 100% compliance, average nightly use 7 hours 48 minutes, minimal mask leak with a reduced AHI of 0.4/h.  Reviewed with patient.  He goes to bed between 9:30 PM and 11 PM and falls asleep quickly.  He generally sleeps an average of 6 to 7 hours.  He will nap 1-2 times per week up to 1.5 hours the longest.  He wakes by 530 to 6:30 AM.  He is currently laying tennis 4 times a week.  He notes on days when he may not have slept well through the night or exert himself more he will want to nap.  He does use his device when he naps.  He denies chest pain, chest tightness, shortness of breath or GERD.  He tolerates mask and pressure well.  He feels overall he sleeps well in therapy.  He notes only having bad nights 3-4 times per month.    Sleep hx:  To reiterate, patient has a long-standing history of sleep apnea initially diagnosed in 2005, RDI at that time was 17, minimum saturation 72%.  He has been compliant with CPAP for many years.  He is currently using CPAP 15 cm H2O.  Device obtained 2019.    He plans on wintering in [place].    Echo  2022 indicated EF 70%, grade 1 diastolic dysfunction, severe left atrial enlargement, mild right atrial enlargement, small to right PFO shunt, sinus of Valsalva dilated 4.2 cm, ascending aorta 4.2 cm, RVSP 31 mmHg.  Compared to 2021 study slight increase in aortic valve velocity and gradients.  Moderate aortic stenosis and now severe left atrial stenosis present.      ROS: As per HPI and otherwise negative if not stated.    Past Medical History:   Diagnosis Date    Hypertension     Obesity     Sleep apnea        Past Surgical History:   Procedure Laterality Date    ARTHROSCOPY, KNEE      [place] REMV 2ND CATARACT,CORN-SCLER SECTN      TONSILLECTOMY      UVULOPHARYNGOPALATOPLASTY         Family History   Problem Relation Age of Onset    Heart Disease Mother     Other Brother     Sleep Apnea Brother        Social History     Socioeconomic History    Marital status:      Spouse name: Not on file    Number of children: Not on file    Years of education: Not on file    Highest education level: Not on file   Occupational History    Not on file   Tobacco Use    Smoking status: Former     Packs/day: 0.25     Years: 2.00     Pack years: 0.50     Types: Cigarettes     Quit date: 11/15/1968     Years since quittin.6    Smokeless tobacco: Never   Vaping Use    Vaping Use: Never used   Substance and Sexual Activity    Alcohol use: No    Drug use: No    Sexual activity: Not on file   Other Topics Concern    Not on file   Social History Narrative    Not on file     Social Determinants of Health     Financial Resource Strain: Not on file   Food Insecurity: Not on file   Transportation Needs: Not on file   Physical Activity: Not on file   Stress: Not on file   Social Connections: Not on file   Intimate Partner Violence: Not on file   Housing Stability: Not on file       Allergies as of 2023 - Reviewed 2023   Allergen Reaction Noted    Hydrochlorothiazide Unspecified 2016    Sulfa drugs  2012  "   Thiazide [hydrochlorothiazide w-triamterene]  05/24/2012        Vitals:  /80 (BP Location: Left arm, Patient Position: Sitting, BP Cuff Size: Adult)   Pulse 75   Resp 16   Ht 1.727 m (5' 8\")   Wt (!) 136 kg (299 lb)   SpO2 94%     Current medications as of today   Current Outpatient Medications   Medication Sig Dispense Refill    Ascorbic Acid (VITAMIN C) 1000 MG Tab Take  by mouth.      omeprazole (PRILOSEC) 20 MG delayed-release capsule Take 20 mg by mouth every day.      aspirin (ASA) 325 MG TABS Take 81 mg by mouth every day.      apixaban (ELIQUIS) 5mg Tab Take 5 mg by mouth.      furosemide (LASIX) 40 MG Tab       apixaban (ELIQUIS) 5mg Tab       losartan (COZAAR) 100 MG Tab       amlodipine (NORVASC) 5 MG TABS Take 1 Tab by mouth every day. 90 Each 3     No current facility-administered medications for this visit.         Physical Exam:   Gen:           Alert and oriented, No apparent distress. Mood and affect appropriate, normal interaction with examiner.  Eyes:          PERRL, EOM intact, sclere white, conjunctive moist. Glasses.  Ears:          Not examined.   Hearing:     Grossly intact.  Nose:          Normal, no lesions or deformities.  Dentition:    Not examined.   Oropharynx:   Not examined.   Mallampati Classification: Not examined.   Neck:        Supple, trachea midline, no masses.  Respiratory Effort: No intercostal retractions or use of accessory muscles.   Lung Auscultation:      Clear to auscultation bilaterally; no rales, rhonchi or wheezing.  CV:            Regular rate and rhythm. No murmurs, rubs or gallops.  Abd:           Not examined.   Lymphadenopathy: Not examined.  Gait and Station: Normal.  Digits and Nails: No clubbing, cyanosis, petechiae, or nodes.   Cranial Nerves: II-XII grossly intact.  Skin:        No rashes, lesions or ulcers noted.               Ext:           No cyanosis or edema.      Assessment:  1. GERI (obstructive sleep apnea)  DME Mask and Supplies      2. " Primary hypertension        3. BMI 45.0-49.9, adult (HCC)  HEIGHT AND WEIGHT      4. Former smoker        5. Need for vaccination  Pneumococcal Conjugate Vaccine 20-Valent (19 yrs+)          Immunizations:    Flu: 9/13/22  Pneumovax 23:not due  Prevnar 13:2019  PCV 20: given today  COVID-19: 10/9/22    Plan:  GERI is well controlled.  Continue CPAP 15 cm nightly.   DME mask/supplies  Follow-up with cardiology for ongoing management of hypertension and recent implant placement.  Encourage weight loss or healthy eating and regular exercise  Prevnar 20 given in office today.  Follow-up in 1 year with compliance report, sooner if needed.    Please note that this dictation was created using voice recognition software. I have made every reasonable attempt to correct obvious errors, but it is possible there are errors of grammar and possibly content that I did not discover before finalizing the note.

## 2024-06-10 ENCOUNTER — OFFICE VISIT (OUTPATIENT)
Dept: CARDIOLOGY | Facility: MEDICAL CENTER | Age: 79
End: 2024-06-10
Attending: INTERNAL MEDICINE
Payer: MEDICARE

## 2024-06-10 ENCOUNTER — TELEPHONE (OUTPATIENT)
Dept: CARDIOLOGY | Facility: MEDICAL CENTER | Age: 79
End: 2024-06-10

## 2024-06-10 VITALS
SYSTOLIC BLOOD PRESSURE: 110 MMHG | OXYGEN SATURATION: 95 % | HEART RATE: 68 BPM | WEIGHT: 300 LBS | BODY MASS INDEX: 47.09 KG/M2 | DIASTOLIC BLOOD PRESSURE: 70 MMHG | RESPIRATION RATE: 16 BRPM | HEIGHT: 67 IN

## 2024-06-10 DIAGNOSIS — Z95.2 HISTORY OF TRANSCATHETER AORTIC VALVE REPLACEMENT (TAVR): ICD-10-CM

## 2024-06-10 DIAGNOSIS — I10 ESSENTIAL HYPERTENSION, BENIGN: ICD-10-CM

## 2024-06-10 DIAGNOSIS — I48.91 ATRIAL FIBRILLATION, UNSPECIFIED TYPE (HCC): ICD-10-CM

## 2024-06-10 DIAGNOSIS — Z79.01 ANTICOAGULATED: ICD-10-CM

## 2024-06-10 LAB — EKG IMPRESSION: NORMAL

## 2024-06-10 PROCEDURE — 99212 OFFICE O/P EST SF 10 MIN: CPT | Performed by: INTERNAL MEDICINE

## 2024-06-10 PROCEDURE — 3078F DIAST BP <80 MM HG: CPT | Performed by: INTERNAL MEDICINE

## 2024-06-10 PROCEDURE — 99204 OFFICE O/P NEW MOD 45 MIN: CPT | Mod: 25 | Performed by: INTERNAL MEDICINE

## 2024-06-10 PROCEDURE — 3074F SYST BP LT 130 MM HG: CPT | Performed by: INTERNAL MEDICINE

## 2024-06-10 PROCEDURE — 93010 ELECTROCARDIOGRAM REPORT: CPT | Performed by: INTERNAL MEDICINE

## 2024-06-10 PROCEDURE — 93005 ELECTROCARDIOGRAM TRACING: CPT | Performed by: INTERNAL MEDICINE

## 2024-06-10 RX ORDER — FLECAINIDE ACETATE 100 MG/1
TABLET ORAL
COMMUNITY
Start: 2024-04-09 | End: 2024-06-10

## 2024-06-10 NOTE — PROGRESS NOTES
Chief Complaint   Patient presents with    Hypertension       Subjective     George Stoll is a 79 y.o. male who presents today status post TAVR in [place] 2 years ago.  Persistent atrial fibrillation symptomatic.  Cardioversion without antiarrhythmics this past summer.  Recently placed on flecainide in April without cardioversion.  Still in atrial fibrillation.  Mildly bradycardic.  On anticoagulation.  Obesity.  On CPAP.  No excess alcohol or caffeine.  Retired educator.  1 EKG from  with typical atrial flutter. Significant fatigue. Obesity.    Past Medical History:   Diagnosis Date    Hypertension     Obesity     Sleep apnea      Past Surgical History:   Procedure Laterality Date    ARTHROSCOPY, KNEE      [place] REMV 2ND CATARACT,CORN-SCLER SECTN      TONSILLECTOMY      UVULOPHARYNGOPALATOPLASTY       Family History   Problem Relation Age of Onset    Heart Disease Mother     Other Brother     Sleep Apnea Brother      Social History     Socioeconomic History    Marital status:      Spouse name: Not on file    Number of children: Not on file    Years of education: Not on file    Highest education level: Not on file   Occupational History    Not on file   Tobacco Use    Smoking status: Former     Current packs/day: 0.00     Average packs/day: 0.3 packs/day for 2.0 years (0.5 ttl pk-yrs)     Types: Cigarettes     Start date: 11/15/1966     Quit date: 11/15/1968     Years since quittin.6     Passive exposure: Past    Smokeless tobacco: Never   Vaping Use    Vaping status: Never Used   Substance and Sexual Activity    Alcohol use: No    Drug use: No    Sexual activity: Not on file   Other Topics Concern    Not on file   Social History Narrative    Not on file     Social Determinants of Health     Financial Resource Strain: Not on file   Food Insecurity: Not on file   Transportation Needs: Not on file   Physical Activity: Not on file   Stress: Not on file   Social Connections: Not on file   Intimate  "Partner Violence: Not on file   Housing Stability: Not on file     Allergies   Allergen Reactions    Hydrochlorothiazide Unspecified     pancreatitis    Sulfa Drugs     Thiazide [Hydrochlorothiazide W-Triamterene]      Outpatient Encounter Medications as of 6/10/2024   Medication Sig Dispense Refill    apixaban (ELIQUIS) 5mg Tab Take 5 mg by mouth.      furosemide (LASIX) 40 MG Tab       apixaban (ELIQUIS) 5mg Tab       losartan (COZAAR) 100 MG Tab 50 mg every day.      Ascorbic Acid (VITAMIN C) 1000 MG Tab Take  by mouth.      omeprazole (PRILOSEC) 20 MG delayed-release capsule Take 20 mg by mouth every day.      [DISCONTINUED] flecainide (TAMBOCOR) 100 MG Tab       [DISCONTINUED] aspirin (ASA) 325 MG TABS Take 81 mg by mouth every day.      [DISCONTINUED] amlodipine (NORVASC) 5 MG TABS Take 1 Tab by mouth every day. 90 Each 3     No facility-administered encounter medications on file as of 6/10/2024.     ROS           Objective     /70 (BP Location: Left arm, Patient Position: Sitting, BP Cuff Size: Adult)   Pulse 68   Resp 16   Ht 1.702 m (5' 7\")   Wt (!) 136 kg (300 lb)   SpO2 95%   BMI 46.99 kg/m²     Physical Exam  Constitutional:       Appearance: He is well-developed. He is obese.   HENT:      Head: Normocephalic and atraumatic.   Cardiovascular:      Rate and Rhythm: Normal rate. Rhythm irregular.      Heart sounds: No murmur heard.     No friction rub. No gallop.   Pulmonary:      Effort: Pulmonary effort is normal.      Breath sounds: Normal breath sounds.   Abdominal:      Palpations: Abdomen is soft.   Musculoskeletal:         General: Normal range of motion.      Cervical back: Normal range of motion and neck supple.   Skin:     General: Skin is warm and dry.   Neurological:      Mental Status: He is alert and oriented to person, place, and time.   Psychiatric:         Behavior: Behavior normal.         Thought Content: Thought content normal.         Judgment: Judgment normal.          "       Assessment & Plan     1. Essential hypertension, benign  EKG    EKG      2. Anticoagulated        3. Atrial fibrillation, unspecified type (HCC)        4. History of transcatheter aortic valve replacement (TAVR)            Medical Decision Making: Today's Assessment/Status/Plan:   1.  Persistent atrial fibrillation.  Discussed options including repeat cardioversion with antiarrhythmics versus catheter ablation patient wished to proceed with catheter ablation.  DC flecainide.  2.  Mildly bradycardic.  Long-term may require pacing.  3.  Status post TAVR.  Normal coronaries prior.  4.  Anticoagulation continue DOAC.  The risks, benefits,and alternatives to atrial fibrillation/flutter ablation with general anesthesia were discussed in great detail. Specific risks mentioned including bleeding, infection, arteriovenous fistula/pseudoaneurysm related to sheath placement, cardiac perforation with possible tamponade requiring pericardiocentesis or possible open heart surgery were discussed. In addition,we discussed atrial fibrillation ablation specific complications including pulmonary vein stenosis, left atrial perforation (2-4%), and nerve damage involving the recurrent laryngeal nerve, the vagus nerve with resulting gastroparesis, and the phrenic nerve.  Also mentioned was a 1/400 (0.25%) occurrence of atrial esophageal fistula, which is an abnormal communication between the esophagus and the left atrium; this complication is often fatal. Lastly the risks of death, myocardial infarction, stroke, DVT and PE were discussed. The patient verbalized understanding of these potential complications and wishes to proceed with this procedure.  The risks, benefits, and alternatives to transesophageal echocardiogram with IV sedation were discussed with the patient in specific detail, including oropharyngeal and esophageal traumas including hoarseness and dysphagia after the procedure. Rare cases demonstrating serious or fatal  complications associated with transesophageal echocardiogram have been reported in the adult population, including cardiac, pulmonary and bleeding complications in less than 1% of people. Patients with an identified intracardiac thrombus are at increased risk for embolic events and this appears to be reduced with anticoagulant therapy. The patient verbalized understandings about these  possible complications and wishes to proceed with this procedure

## 2024-06-10 NOTE — LETTER
[facility] - [facility]   [address], [address]  [place] [address]  Phone: [phone]  Fax: [phone]              George Stoll  1945    Encounter Date: 6/10/2024    Luis Alberto Regalado M.D.          PROGRESS NOTE:  Chief Complaint   Patient presents with    Hypertension       Subjective     George Stoll is a 79 y.o. male who presents today status post TAVR in [place] 2 years ago.  Persistent atrial fibrillation symptomatic.  Cardioversion without antiarrhythmics this past summer.  Recently placed on flecainide in April without cardioversion.  Still in atrial fibrillation.  Mildly bradycardic.  On anticoagulation.  Obesity.  On CPAP.  No excess alcohol or caffeine.  Retired educator.  1 EKG from  with typical atrial flutter. Significant fatigue. Obesity.    Past Medical History:   Diagnosis Date    Hypertension     Obesity     Sleep apnea      Past Surgical History:   Procedure Laterality Date    ARTHROSCOPY, KNEE      [place] REMV 2ND CATARACT,CORN-SCLER SECTN      TONSILLECTOMY      UVULOPHARYNGOPALATOPLASTY       Family History   Problem Relation Age of Onset    Heart Disease Mother     Other Brother     Sleep Apnea Brother      Social History     Socioeconomic History    Marital status:      Spouse name: Not on file    Number of children: Not on file    Years of education: Not on file    Highest education level: Not on file   Occupational History    Not on file   Tobacco Use    Smoking status: Former     Current packs/day: 0.00     Average packs/day: 0.3 packs/day for 2.0 years (0.5 ttl pk-yrs)     Types: Cigarettes     Start date: 11/15/1966     Quit date: 11/15/1968     Years since quittin.6     Passive exposure: Past    Smokeless tobacco: Never   Vaping Use    Vaping status: Never Used   Substance and Sexual Activity    Alcohol use: No    Drug use: No    Sexual activity: Not on file   Other Topics  "Concern    Not on file   Social History Narrative    Not on file     Social Determinants of Health     Financial Resource Strain: Not on file   Food Insecurity: Not on file   Transportation Needs: Not on file   Physical Activity: Not on file   Stress: Not on file   Social Connections: Not on file   Intimate Partner Violence: Not on file   Housing Stability: Not on file     Allergies   Allergen Reactions    Hydrochlorothiazide Unspecified     pancreatitis    Sulfa Drugs     Thiazide [Hydrochlorothiazide W-Triamterene]      Outpatient Encounter Medications as of 6/10/2024   Medication Sig Dispense Refill    apixaban (ELIQUIS) 5mg Tab Take 5 mg by mouth.      furosemide (LASIX) 40 MG Tab       apixaban (ELIQUIS) 5mg Tab       losartan (COZAAR) 100 MG Tab 50 mg every day.      Ascorbic Acid (VITAMIN C) 1000 MG Tab Take  by mouth.      omeprazole (PRILOSEC) 20 MG delayed-release capsule Take 20 mg by mouth every day.      [DISCONTINUED] flecainide (TAMBOCOR) 100 MG Tab       [DISCONTINUED] aspirin (ASA) 325 MG TABS Take 81 mg by mouth every day.      [DISCONTINUED] amlodipine (NORVASC) 5 MG TABS Take 1 Tab by mouth every day. 90 Each 3     No facility-administered encounter medications on file as of 6/10/2024.     ROS           Objective     /70 (BP Location: Left arm, Patient Position: Sitting, BP Cuff Size: Adult)   Pulse 68   Resp 16   Ht 1.702 m (5' 7\")   Wt (!) 136 kg (300 lb)   SpO2 95%   BMI 46.99 kg/m²     Physical Exam  Constitutional:       Appearance: He is well-developed. He is obese.   HENT:      Head: Normocephalic and atraumatic.   Cardiovascular:      Rate and Rhythm: Normal rate. Rhythm irregular.      Heart sounds: No murmur heard.     No friction rub. No gallop.   Pulmonary:      Effort: Pulmonary effort is normal.      Breath sounds: Normal breath sounds.   Abdominal:      Palpations: Abdomen is soft.   Musculoskeletal:         General: Normal range of motion.      Cervical back: Normal " range of motion and neck supple.   Skin:     General: Skin is warm and dry.   Neurological:      Mental Status: He is alert and oriented to person, place, and time.   Psychiatric:         Behavior: Behavior normal.         Thought Content: Thought content normal.         Judgment: Judgment normal.                Assessment & Plan     1. Essential hypertension, benign  EKG    EKG      2. Anticoagulated        3. Atrial fibrillation, unspecified type (HCC)        4. History of transcatheter aortic valve replacement (TAVR)            Medical Decision Making: Today's Assessment/Status/Plan:   1.  Persistent atrial fibrillation.  Discussed options including repeat cardioversion with antiarrhythmics versus catheter ablation patient wished to proceed with catheter ablation.  DC flecainide.  2.  Mildly bradycardic.  Long-term may require pacing.  3.  Status post TAVR.  Normal coronaries prior.  4.  Anticoagulation continue DOAC.  The risks, benefits,and alternatives to atrial fibrillation/flutter ablation with general anesthesia were discussed in great detail. Specific risks mentioned including bleeding, infection, arteriovenous fistula/pseudoaneurysm related to sheath placement, cardiac perforation with possible tamponade requiring pericardiocentesis or possible open heart surgery were discussed. In addition,we discussed atrial fibrillation ablation specific complications including pulmonary vein stenosis, left atrial perforation (2-4%), and nerve damage involving the recurrent laryngeal nerve, the vagus nerve with resulting gastroparesis, and the phrenic nerve.  Also mentioned was a 1/400 (0.25%) occurrence of atrial esophageal fistula, which is an abnormal communication between the esophagus and the left atrium; this complication is often fatal. Lastly the risks of death, myocardial infarction, stroke, DVT and PE were discussed. The patient verbalized understanding of these potential complications and wishes to proceed  with this procedure.  The risks, benefits, and alternatives to transesophageal echocardiogram with IV sedation were discussed with the patient in specific detail, including oropharyngeal and esophageal traumas including hoarseness and dysphagia after the procedure. Rare cases demonstrating serious or fatal complications associated with transesophageal echocardiogram have been reported in the adult population, including cardiac, pulmonary and bleeding complications in less than 1% of people. Patients with an identified intracardiac thrombus are at increased risk for embolic events and this appears to be reduced with anticoagulant therapy. The patient verbalized understandings about these  possible complications and wishes to proceed with this procedure                           Alessio Garcia D.O.  [address] [facility]  [place] [address]  Via Fax: [phone]

## 2024-06-10 NOTE — TELEPHONE ENCOUNTER
Patient scheduled for afib ablation w/ROCÍO on 8-20-24 with Dr. Regalado. Patient has been instructed to check in at 8:00 for 10:00 case time. Message sent to authorizations. Emailed Juan Francisco and RADHA

## 2024-07-10 ENCOUNTER — OFFICE VISIT (OUTPATIENT)
Dept: SLEEP MEDICINE | Facility: MEDICAL CENTER | Age: 79
End: 2024-07-10
Attending: NURSE PRACTITIONER
Payer: MEDICARE

## 2024-07-10 VITALS
RESPIRATION RATE: 18 BRPM | BODY MASS INDEX: 44.1 KG/M2 | HEART RATE: 72 BPM | WEIGHT: 291 LBS | HEIGHT: 68 IN | DIASTOLIC BLOOD PRESSURE: 62 MMHG | OXYGEN SATURATION: 94 % | SYSTOLIC BLOOD PRESSURE: 112 MMHG

## 2024-07-10 DIAGNOSIS — Z87.891 FORMER SMOKER: ICD-10-CM

## 2024-07-10 DIAGNOSIS — G47.33 OSA (OBSTRUCTIVE SLEEP APNEA): ICD-10-CM

## 2024-07-10 PROCEDURE — 3078F DIAST BP <80 MM HG: CPT | Performed by: NURSE PRACTITIONER

## 2024-07-10 PROCEDURE — 3074F SYST BP LT 130 MM HG: CPT | Performed by: NURSE PRACTITIONER

## 2024-07-10 PROCEDURE — 99213 OFFICE O/P EST LOW 20 MIN: CPT | Performed by: NURSE PRACTITIONER

## 2024-07-10 ASSESSMENT — PATIENT HEALTH QUESTIONNAIRE - PHQ9: CLINICAL INTERPRETATION OF PHQ2 SCORE: 0

## 2024-07-22 ENCOUNTER — APPOINTMENT (OUTPATIENT)
Dept: ADMISSIONS | Facility: MEDICAL CENTER | Age: 79
End: 2024-07-22
Attending: INTERNAL MEDICINE
Payer: MEDICARE

## 2024-07-26 ENCOUNTER — PRE-ADMISSION TESTING (OUTPATIENT)
Dept: ADMISSIONS | Facility: MEDICAL CENTER | Age: 79
End: 2024-07-26
Attending: INTERNAL MEDICINE
Payer: MEDICARE

## 2024-07-26 DIAGNOSIS — Z01.810 PRE-OPERATIVE CARDIOVASCULAR EXAMINATION: ICD-10-CM

## 2024-07-26 DIAGNOSIS — Z01.812 PRE-OPERATIVE LABORATORY EXAMINATION: ICD-10-CM

## 2024-08-19 ENCOUNTER — PRE-ADMISSION TESTING (OUTPATIENT)
Dept: ADMISSIONS | Facility: MEDICAL CENTER | Age: 79
End: 2024-08-19
Attending: INTERNAL MEDICINE
Payer: MEDICARE

## 2024-08-19 ENCOUNTER — TELEPHONE (OUTPATIENT)
Dept: CARDIOLOGY | Facility: MEDICAL CENTER | Age: 79
End: 2024-08-19

## 2024-08-19 DIAGNOSIS — Z01.812 PRE-OPERATIVE LABORATORY EXAMINATION: ICD-10-CM

## 2024-08-19 DIAGNOSIS — Z01.810 PRE-OPERATIVE CARDIOVASCULAR EXAMINATION: ICD-10-CM

## 2024-08-19 LAB
ALBUMIN SERPL BCP-MCNC: 4.1 G/DL (ref 3.2–4.9)
ALBUMIN/GLOB SERPL: 1.6 G/DL
ALP SERPL-CCNC: 70 U/L (ref 30–99)
ALT SERPL-CCNC: 17 U/L (ref 2–50)
ANION GAP SERPL CALC-SCNC: 12 MMOL/L (ref 7–16)
APTT PPP: 32 SEC (ref 24.7–36)
AST SERPL-CCNC: 20 U/L (ref 12–45)
BASOPHILS # BLD AUTO: 0.8 % (ref 0–1.8)
BASOPHILS # BLD: 0.06 K/UL (ref 0–0.12)
BILIRUB SERPL-MCNC: 1.4 MG/DL (ref 0.1–1.5)
BUN SERPL-MCNC: 15 MG/DL (ref 8–22)
CALCIUM ALBUM COR SERPL-MCNC: 9.1 MG/DL (ref 8.5–10.5)
CALCIUM SERPL-MCNC: 9.2 MG/DL (ref 8.5–10.5)
CHLORIDE SERPL-SCNC: 103 MMOL/L (ref 96–112)
CO2 SERPL-SCNC: 26 MMOL/L (ref 20–33)
CREAT SERPL-MCNC: 0.89 MG/DL (ref 0.5–1.4)
EKG IMPRESSION: NORMAL
EOSINOPHIL # BLD AUTO: 0.11 K/UL (ref 0–0.51)
EOSINOPHIL NFR BLD: 1.5 % (ref 0–6.9)
ERYTHROCYTE [DISTWIDTH] IN BLOOD BY AUTOMATED COUNT: 47.5 FL (ref 35.9–50)
GFR SERPLBLD CREATININE-BSD FMLA CKD-EPI: 88 ML/MIN/1.73 M 2
GLOBULIN SER CALC-MCNC: 2.6 G/DL (ref 1.9–3.5)
GLUCOSE SERPL-MCNC: 116 MG/DL (ref 65–99)
HCT VFR BLD AUTO: 44 % (ref 42–52)
HGB BLD-MCNC: 15.3 G/DL (ref 14–18)
IMM GRANULOCYTES # BLD AUTO: 0.02 K/UL (ref 0–0.11)
IMM GRANULOCYTES NFR BLD AUTO: 0.3 % (ref 0–0.9)
INR PPP: 1.15 (ref 0.87–1.13)
LYMPHOCYTES # BLD AUTO: 1.89 K/UL (ref 1–4.8)
LYMPHOCYTES NFR BLD: 26.5 % (ref 22–41)
MCH RBC QN AUTO: 32.7 PG (ref 27–33)
MCHC RBC AUTO-ENTMCNC: 34.8 G/DL (ref 32.3–36.5)
MCV RBC AUTO: 94 FL (ref 81.4–97.8)
MONOCYTES # BLD AUTO: 0.41 K/UL (ref 0–0.85)
MONOCYTES NFR BLD AUTO: 5.7 % (ref 0–13.4)
NEUTROPHILS # BLD AUTO: 4.65 K/UL (ref 1.82–7.42)
NEUTROPHILS NFR BLD: 65.2 % (ref 44–72)
NRBC # BLD AUTO: 0 K/UL
NRBC BLD-RTO: 0 /100 WBC (ref 0–0.2)
PLATELET # BLD AUTO: 189 K/UL (ref 164–446)
PMV BLD AUTO: 11.3 FL (ref 9–12.9)
POTASSIUM SERPL-SCNC: 3.9 MMOL/L (ref 3.6–5.5)
PROT SERPL-MCNC: 6.7 G/DL (ref 6–8.2)
PROTHROMBIN TIME: 14.9 SEC (ref 12–14.6)
RBC # BLD AUTO: 4.68 M/UL (ref 4.7–6.1)
SODIUM SERPL-SCNC: 141 MMOL/L (ref 135–145)
TSH SERPL-ACNC: 1.96 UIU/ML (ref 0.35–5.5)
WBC # BLD AUTO: 7.1 K/UL (ref 4.8–10.8)

## 2024-08-19 PROCEDURE — 84443 ASSAY THYROID STIM HORMONE: CPT

## 2024-08-19 PROCEDURE — 85730 THROMBOPLASTIN TIME PARTIAL: CPT

## 2024-08-19 PROCEDURE — 93010 ELECTROCARDIOGRAM REPORT: CPT | Performed by: INTERNAL MEDICINE

## 2024-08-19 PROCEDURE — 36415 COLL VENOUS BLD VENIPUNCTURE: CPT

## 2024-08-19 PROCEDURE — 93005 ELECTROCARDIOGRAM TRACING: CPT

## 2024-08-19 PROCEDURE — 80053 COMPREHEN METABOLIC PANEL: CPT

## 2024-08-19 PROCEDURE — 85025 COMPLETE CBC W/AUTO DIFF WBC: CPT

## 2024-08-19 PROCEDURE — 85610 PROTHROMBIN TIME: CPT

## 2024-08-19 NOTE — TELEPHONE ENCOUNTER
Phone Number Called: [phone]    Call outcome: S/w patient wife Yaneli    Message: Called to discuss patients blood thinner. Patient's wife asking if the patient should be taking his blood thinner tonight before tomorrows ablation. Wife instructed to have patient continue eliquis tonight and she verbalized understanding.

## 2024-08-19 NOTE — TELEPHONE ENCOUNTER
DS  Caller: Yaneli - Wife     Topic/issue: Patient wanted to speak with DS regarding Eliquis in preparation for his ablation tomorrow. Patient already took a pill this AM but wanted to know further instructions for tonight and tomorrow.     Callback Number: [phone]    Thank you,  Kristy OLSEN

## 2024-08-20 ENCOUNTER — ANESTHESIA EVENT (OUTPATIENT)
Dept: CARDIOLOGY | Facility: MEDICAL CENTER | Age: 79
End: 2024-08-20
Payer: MEDICARE

## 2024-08-20 ENCOUNTER — APPOINTMENT (OUTPATIENT)
Dept: CARDIOLOGY | Facility: MEDICAL CENTER | Age: 79
End: 2024-08-20
Attending: INTERNAL MEDICINE
Payer: MEDICARE

## 2024-08-20 ENCOUNTER — HOSPITAL ENCOUNTER (OUTPATIENT)
Facility: MEDICAL CENTER | Age: 79
End: 2024-08-20
Attending: INTERNAL MEDICINE | Admitting: INTERNAL MEDICINE
Payer: MEDICARE

## 2024-08-20 ENCOUNTER — ANESTHESIA (OUTPATIENT)
Dept: CARDIOLOGY | Facility: MEDICAL CENTER | Age: 79
End: 2024-08-20
Payer: MEDICARE

## 2024-08-20 VITALS
HEIGHT: 68 IN | SYSTOLIC BLOOD PRESSURE: 132 MMHG | RESPIRATION RATE: 15 BRPM | OXYGEN SATURATION: 95 % | BODY MASS INDEX: 44.51 KG/M2 | HEART RATE: 82 BPM | TEMPERATURE: 97.4 F | DIASTOLIC BLOOD PRESSURE: 75 MMHG | WEIGHT: 293.65 LBS

## 2024-08-20 DIAGNOSIS — I48.91 ATRIAL FIBRILLATION, UNSPECIFIED TYPE (HCC): ICD-10-CM

## 2024-08-20 LAB
ACT BLD: 299 SEC (ref 74–137)
ACT BLD: 311 SEC (ref 74–137)
ACT BLD: 318 SEC (ref 74–137)
ACT BLD: 318 SEC (ref 74–137)
EKG IMPRESSION: NORMAL

## 2024-08-20 PROCEDURE — 700101 HCHG RX REV CODE 250

## 2024-08-20 PROCEDURE — 93656 COMPRE EP EVAL ABLTJ ATR FIB: CPT | Performed by: INTERNAL MEDICINE

## 2024-08-20 PROCEDURE — 160047 HCHG PACU  - EA ADDL 30 MINS PHASE II

## 2024-08-20 PROCEDURE — 160035 HCHG PACU - 1ST 60 MINS PHASE I

## 2024-08-20 PROCEDURE — 700111 HCHG RX REV CODE 636 W/ 250 OVERRIDE (IP): Performed by: STUDENT IN AN ORGANIZED HEALTH CARE EDUCATION/TRAINING PROGRAM

## 2024-08-20 PROCEDURE — 700101 HCHG RX REV CODE 250: Performed by: STUDENT IN AN ORGANIZED HEALTH CARE EDUCATION/TRAINING PROGRAM

## 2024-08-20 PROCEDURE — 160036 HCHG PACU - EA ADDL 30 MINS PHASE I

## 2024-08-20 PROCEDURE — 160046 HCHG PACU - 1ST 60 MINS PHASE II

## 2024-08-20 PROCEDURE — 85347 COAGULATION TIME ACTIVATED: CPT

## 2024-08-20 PROCEDURE — 160002 HCHG RECOVERY MINUTES (STAT)

## 2024-08-20 PROCEDURE — 700105 HCHG RX REV CODE 258: Performed by: INTERNAL MEDICINE

## 2024-08-20 PROCEDURE — 93005 ELECTROCARDIOGRAM TRACING: CPT | Performed by: INTERNAL MEDICINE

## 2024-08-20 PROCEDURE — 305383 CL-EP ABLATION ATRIAL FIBRILLATION

## 2024-08-20 PROCEDURE — 93010 ELECTROCARDIOGRAM REPORT: CPT | Mod: 59 | Performed by: INTERNAL MEDICINE

## 2024-08-20 PROCEDURE — 93655 ICAR CATH ABLTJ DSCRT ARRHYT: CPT | Performed by: INTERNAL MEDICINE

## 2024-08-20 PROCEDURE — 700111 HCHG RX REV CODE 636 W/ 250 OVERRIDE (IP)

## 2024-08-20 PROCEDURE — 93657 TX L/R ATRIAL FIB ADDL: CPT | Performed by: INTERNAL MEDICINE

## 2024-08-20 PROCEDURE — 93325 DOPPLER ECHO COLOR FLOW MAPG: CPT

## 2024-08-20 RX ORDER — ACETAMINOPHEN 325 MG/1
650 TABLET ORAL EVERY 4 HOURS PRN
Status: DISCONTINUED | OUTPATIENT
Start: 2024-08-20 | End: 2024-08-20 | Stop reason: HOSPADM

## 2024-08-20 RX ORDER — LIDOCAINE HYDROCHLORIDE 40 MG/ML
SOLUTION TOPICAL PRN
Status: DISCONTINUED | OUTPATIENT
Start: 2024-08-20 | End: 2024-08-20 | Stop reason: SURG

## 2024-08-20 RX ORDER — DIPHENHYDRAMINE HYDROCHLORIDE 50 MG/ML
12.5 INJECTION INTRAMUSCULAR; INTRAVENOUS
Status: DISCONTINUED | OUTPATIENT
Start: 2024-08-20 | End: 2024-08-20 | Stop reason: HOSPADM

## 2024-08-20 RX ORDER — ALBUTEROL SULFATE 5 MG/ML
2.5 SOLUTION RESPIRATORY (INHALATION)
Status: DISCONTINUED | OUTPATIENT
Start: 2024-08-20 | End: 2024-08-20 | Stop reason: HOSPADM

## 2024-08-20 RX ORDER — ONDANSETRON 2 MG/ML
INJECTION INTRAMUSCULAR; INTRAVENOUS PRN
Status: DISCONTINUED | OUTPATIENT
Start: 2024-08-20 | End: 2024-08-20 | Stop reason: SURG

## 2024-08-20 RX ORDER — SODIUM CHLORIDE, SODIUM LACTATE, POTASSIUM CHLORIDE, CALCIUM CHLORIDE 600; 310; 30; 20 MG/100ML; MG/100ML; MG/100ML; MG/100ML
INJECTION, SOLUTION INTRAVENOUS CONTINUOUS
Status: DISCONTINUED | OUTPATIENT
Start: 2024-08-20 | End: 2024-08-20 | Stop reason: HOSPADM

## 2024-08-20 RX ORDER — DEXAMETHASONE SODIUM PHOSPHATE 4 MG/ML
INJECTION, SOLUTION INTRA-ARTICULAR; INTRALESIONAL; INTRAMUSCULAR; INTRAVENOUS; SOFT TISSUE PRN
Status: DISCONTINUED | OUTPATIENT
Start: 2024-08-20 | End: 2024-08-20 | Stop reason: SURG

## 2024-08-20 RX ORDER — SODIUM CHLORIDE, SODIUM LACTATE, POTASSIUM CHLORIDE, CALCIUM CHLORIDE 600; 310; 30; 20 MG/100ML; MG/100ML; MG/100ML; MG/100ML
INJECTION, SOLUTION INTRAVENOUS CONTINUOUS
Status: ACTIVE | OUTPATIENT
Start: 2024-08-20 | End: 2024-08-20

## 2024-08-20 RX ORDER — HEPARIN SODIUM 1000 [USP'U]/ML
INJECTION, SOLUTION INTRAVENOUS; SUBCUTANEOUS
Status: COMPLETED
Start: 2024-08-20 | End: 2024-08-20

## 2024-08-20 RX ORDER — EPHEDRINE SULFATE 50 MG/ML
5 INJECTION, SOLUTION INTRAVENOUS
Status: DISCONTINUED | OUTPATIENT
Start: 2024-08-20 | End: 2024-08-20 | Stop reason: HOSPADM

## 2024-08-20 RX ORDER — ONDANSETRON 2 MG/ML
4 INJECTION INTRAMUSCULAR; INTRAVENOUS
Status: DISCONTINUED | OUTPATIENT
Start: 2024-08-20 | End: 2024-08-20 | Stop reason: HOSPADM

## 2024-08-20 RX ORDER — PROTAMINE SULFATE 10 MG/ML
INJECTION, SOLUTION INTRAVENOUS
Status: DISCONTINUED
Start: 2024-08-20 | End: 2024-08-20 | Stop reason: HOSPADM

## 2024-08-20 RX ORDER — PROTAMINE SULFATE 10 MG/ML
INJECTION, SOLUTION INTRAVENOUS PRN
Status: DISCONTINUED | OUTPATIENT
Start: 2024-08-20 | End: 2024-08-20 | Stop reason: SURG

## 2024-08-20 RX ORDER — HYDRALAZINE HYDROCHLORIDE 20 MG/ML
5 INJECTION INTRAMUSCULAR; INTRAVENOUS
Status: DISCONTINUED | OUTPATIENT
Start: 2024-08-20 | End: 2024-08-20 | Stop reason: HOSPADM

## 2024-08-20 RX ORDER — OXYCODONE HCL 5 MG/5 ML
5 SOLUTION, ORAL ORAL
Status: DISCONTINUED | OUTPATIENT
Start: 2024-08-20 | End: 2024-08-20 | Stop reason: HOSPADM

## 2024-08-20 RX ORDER — PHENYLEPHRINE HYDROCHLORIDE 10 MG/ML
INJECTION, SOLUTION INTRAMUSCULAR; INTRAVENOUS; SUBCUTANEOUS PRN
Status: DISCONTINUED | OUTPATIENT
Start: 2024-08-20 | End: 2024-08-20 | Stop reason: SURG

## 2024-08-20 RX ORDER — EPHEDRINE SULFATE 50 MG/ML
INJECTION, SOLUTION INTRAVENOUS PRN
Status: DISCONTINUED | OUTPATIENT
Start: 2024-08-20 | End: 2024-08-20 | Stop reason: SURG

## 2024-08-20 RX ORDER — KETOROLAC TROMETHAMINE 15 MG/ML
INJECTION, SOLUTION INTRAMUSCULAR; INTRAVENOUS PRN
Status: DISCONTINUED | OUTPATIENT
Start: 2024-08-20 | End: 2024-08-20 | Stop reason: SURG

## 2024-08-20 RX ORDER — LIDOCAINE HYDROCHLORIDE 40 MG/ML
SOLUTION TOPICAL
Status: COMPLETED
Start: 2024-08-20 | End: 2024-08-20

## 2024-08-20 RX ORDER — LIDOCAINE HYDROCHLORIDE 20 MG/ML
INJECTION, SOLUTION INFILTRATION; PERINEURAL
Status: COMPLETED
Start: 2024-08-20 | End: 2024-08-20

## 2024-08-20 RX ORDER — CEFAZOLIN SODIUM 1 G/3ML
INJECTION, POWDER, FOR SOLUTION INTRAMUSCULAR; INTRAVENOUS PRN
Status: DISCONTINUED | OUTPATIENT
Start: 2024-08-20 | End: 2024-08-20 | Stop reason: SURG

## 2024-08-20 RX ORDER — HEPARIN SODIUM 200 [USP'U]/100ML
INJECTION, SOLUTION INTRAVENOUS
Status: COMPLETED
Start: 2024-08-20 | End: 2024-08-20

## 2024-08-20 RX ORDER — OXYCODONE HCL 5 MG/5 ML
10 SOLUTION, ORAL ORAL
Status: DISCONTINUED | OUTPATIENT
Start: 2024-08-20 | End: 2024-08-20 | Stop reason: HOSPADM

## 2024-08-20 RX ORDER — PHENYLEPHRINE HCL IN 0.9% NACL 1 MG/10 ML
SYRINGE (ML) INTRAVENOUS
Status: DISCONTINUED
Start: 2024-08-20 | End: 2024-08-20 | Stop reason: HOSPADM

## 2024-08-20 RX ORDER — HALOPERIDOL 5 MG/ML
1 INJECTION INTRAMUSCULAR
Status: DISCONTINUED | OUTPATIENT
Start: 2024-08-20 | End: 2024-08-20 | Stop reason: HOSPADM

## 2024-08-20 RX ADMIN — PROPOFOL 160 MG: 10 INJECTION, EMULSION INTRAVENOUS at 10:20

## 2024-08-20 RX ADMIN — PROTAMINE SULFATE 30 MG: 10 INJECTION, SOLUTION INTRAVENOUS at 12:26

## 2024-08-20 RX ADMIN — LIDOCAINE HYDROCHLORIDE 4 ML: 40 SOLUTION TOPICAL at 10:21

## 2024-08-20 RX ADMIN — PROTAMINE SULFATE 20 MG: 10 INJECTION, SOLUTION INTRAVENOUS at 12:23

## 2024-08-20 RX ADMIN — GLYCOPYRROLATE 0.3 MG: 0.2 INJECTION INTRAMUSCULAR; INTRAVENOUS at 10:52

## 2024-08-20 RX ADMIN — SODIUM CHLORIDE, POTASSIUM CHLORIDE, SODIUM LACTATE AND CALCIUM CHLORIDE: 600; 310; 30; 20 INJECTION, SOLUTION INTRAVENOUS at 10:12

## 2024-08-20 RX ADMIN — EPHEDRINE SULFATE 5 MG: 50 INJECTION, SOLUTION INTRAVENOUS at 10:52

## 2024-08-20 RX ADMIN — EPHEDRINE SULFATE 10 MG: 50 INJECTION, SOLUTION INTRAVENOUS at 10:40

## 2024-08-20 RX ADMIN — FENTANYL CITRATE 100 MCG: 50 INJECTION, SOLUTION INTRAMUSCULAR; INTRAVENOUS at 10:18

## 2024-08-20 RX ADMIN — PHENYLEPHRINE HYDROCHLORIDE 100 MCG: 10 INJECTION INTRAVENOUS at 10:59

## 2024-08-20 RX ADMIN — DEXAMETHASONE SODIUM PHOSPHATE 8 MG: 4 INJECTION INTRA-ARTICULAR; INTRALESIONAL; INTRAMUSCULAR; INTRAVENOUS; SOFT TISSUE at 10:34

## 2024-08-20 RX ADMIN — ROCURONIUM BROMIDE 100 MG: 10 INJECTION, SOLUTION INTRAVENOUS at 10:21

## 2024-08-20 RX ADMIN — HEPARIN SODIUM 2000 UNITS: 200 INJECTION, SOLUTION INTRAVENOUS at 10:05

## 2024-08-20 RX ADMIN — HEPARIN SODIUM: 1000 INJECTION, SOLUTION INTRAVENOUS; SUBCUTANEOUS at 10:05

## 2024-08-20 RX ADMIN — KETOROLAC TROMETHAMINE 15 MG: 15 INJECTION, SOLUTION INTRAMUSCULAR; INTRAVENOUS at 12:27

## 2024-08-20 RX ADMIN — SUGAMMADEX 200 MG: 100 INJECTION, SOLUTION INTRAVENOUS at 12:29

## 2024-08-20 RX ADMIN — LIDOCAINE HYDROCHLORIDE: 20 INJECTION, SOLUTION INFILTRATION; PERINEURAL at 10:05

## 2024-08-20 RX ADMIN — CEFAZOLIN 2 G: 1 INJECTION, POWDER, FOR SOLUTION INTRAMUSCULAR; INTRAVENOUS at 10:34

## 2024-08-20 RX ADMIN — HEPARIN SODIUM: 1000 INJECTION, SOLUTION INTRAVENOUS; SUBCUTANEOUS at 11:54

## 2024-08-20 RX ADMIN — ONDANSETRON 4 MG: 2 INJECTION INTRAMUSCULAR; INTRAVENOUS at 12:24

## 2024-08-20 RX ADMIN — PROPOFOL 80 MCG/KG/MIN: 10 INJECTION, EMULSION INTRAVENOUS at 10:35

## 2024-08-20 ASSESSMENT — FIBROSIS 4 INDEX: FIB4 SCORE: 1.98

## 2024-08-20 ASSESSMENT — PAIN SCALES - GENERAL: PAIN_LEVEL: 0

## 2024-08-20 NOTE — ANESTHESIA PROCEDURE NOTES
ROCÍO    Date/Time: 8/20/2024 10:30 AM    Performed by: Mara Black M.D.  Authorized by: Mara Black M.D.    Start Time:8/20/2024 10:30 AM  Preanesthetic Checklist: patient identified, IV checked, site marked, risks and benefits discussed, surgical consent, monitors and equipment checked, pre-op evaluation and timeout performed    Indication for ROCÍO: diagnostic   Patient Location: OR  Intubated: Yes  Bite Block: Yes  Heart Visualized: Yes  Insertion: atraumatic    **See FULL ROCÍO report in patient's chart via [facility]**

## 2024-08-20 NOTE — ANESTHESIA POSTPROCEDURE EVALUATION
Patient: George Stoll    Procedure Summary       Date: 08/20/24 Room / Location: [facility]    Anesthesia Start: 1012 Anesthesia Stop: 1249    Procedure: CL-EP ABLATION ATRIAL FIBRILLATION Diagnosis:       Atrial fibrillation, unspecified type (HCC)      (See Associated Dx)    Scheduled Providers: Luis Alberto Regalado M.D.; Mara Black M.D. Responsible Provider: Mara Black M.D.    Anesthesia Type: general ASA Status: 3            Final Anesthesia Type: general  Last vitals  BP   Blood Pressure : 131/76    Temp   36.2 °C (97.2 °F)    Pulse   95   Resp   14    SpO2   96 %      Anesthesia Post Evaluation    Patient location during evaluation: PACU  Patient participation: complete - patient participated  Level of consciousness: awake  Pain score: 0    Airway patency: patent  Anesthetic complications: no  Cardiovascular status: hemodynamically stable  Respiratory status: acceptable and face mask  Hydration status: euvolemic    PONV: none          No notable events documented.

## 2024-08-20 NOTE — ANESTHESIA TIME REPORT
Anesthesia Start and Stop Event Times       Date Time Event    8/20/2024 0955 Ready for Procedure     1012 Anesthesia Start     1249 Anesthesia Stop          Responsible Staff  08/20/24      Name Role Begin End    Mara Black M.D. Anesth 1012 1249          Overtime Reason:  no overtime (within assigned shift)    Comments:

## 2024-08-20 NOTE — ANESTHESIA PROCEDURE NOTES
Arterial Line    Performed by: Mara Black M.D.  Authorized by: Mara Black M.D.    Start Time:  8/20/2024 10:26 AM  End Time:  8/20/2024 10:29 AM  Localization: ultrasound guidance  Image captured, interpreted and electronically stored.  Patient Location:  OR  Indication: continuous blood pressure monitoring        Catheter Size:  20 G  Seldinger Technique?: Yes    Site:  Radial artery  Line Secured:  Antimicrobial disc, tape and transparent dressing  Events: patient tolerated procedure well with no complications

## 2024-08-20 NOTE — PROGRESS NOTES
Seen in afternoon same day discharge EP rounds.  S/P Ablation of persistent atrial fibrillation and flutter with PFA PVI, PWI, RFA of LA focal AT and RA isthmus.        Conclusions per Dr Regalado's Op Note dated 8/20/24:  Electrophysiological Findings:  Atrial fibrillation RR  937 msec  Intervals- A-H 110 msec  H-V 49 msec   msec   msec   msec  Total RF time: 415 sec  Total PF applications: 72  Fluoro time: 225 mGy  Complications: None    Impression:  1. Atrial fibrillation, persistent  2. Successful isolation of all four pulmonary veins and posterior wall with PFA  3. Focal LA tachycardia ablated.  4. RA isthmus ablation    Monitored rhythm has been sinus/sinus arrhythmia throughout monitored recovery.  Vital signs are stable. He has ambulated.  Right femoral venous access site is clean and dry; there is no evidence of hematoma, bleeding or pain to the site.       Discharge instructions discussed with patient:  [facility] Post Ablation Patient Instructions:  No lifting > 10 lbs x 1 week.    No soaking in baths, hot tubs, pools x 1 week.  May shower the day after discharge and take off groin dressings and leave uncovered.  Continue to monitor sites daily for warmth, redness, discolored drainage.  It is common to have a small lump in the area where the cather was (usually the size of a small marble); this will go away but takes approximately 6 weeks to normalize.   3.   Please take all medications as prescribed to you; please do not stop any medications prescribed post ablation unless directed by your healthcare provider.    4.   Please do not miss any doses of your blood thinner (if you have been started on, or take chronic blood thinners) without discussion with your healthcare provider first.   5.   Please walk and take deep breaths after discharge.  After discharge, if  experiences neurological changes/signs of stroke, high fever, you should be seen in the  emergency dept.   6.   It is possible you may experience some chest discomfort or chest tightness post ablation.  This is usually secondary to inflammation and irritation of the tissues at the area of the ablation.  If this occurs, it is advised to try 400 mg of Ibuprofen with food as needed up to three times a day for a maximum of two days.  This should help to decrease pain and tissue inflammation.          **Please notify the office ([phone]) if this occurs.         ** DO NOT TAKE Ibuprofen IF HISTORY OF SIGNIFICANT BLEEDING OR KIDNEY DISEASE WITHOUT DISCUSSING WITH YOUR CARDIOLOGY PROVIDER FIRST.          ** If pain becomes severe or you have additional symptoms you may need to be medically evaluated; please contact the cardiology office ([phone]) for further direction.   7. It is possible that you may experience arrhythmia/Atrial Fibrillation post ablation.  This is secondary to irritation and inflammation of the cardiac tissues from the ablation.  If you have atrial fibrillation all day or feel poorly with it, please notify your cardiologist's via phone ([phone]) or [employer].    8.  Please contact call our office ([phone]) or message via [employer] message if you have any questions or concerns post procedurally.  9. You need to be seen for post ablation follow up 2-4 weeks post procedure.  If you do not have a follow up appointment scheduled, please call [phone] to schedule your follow up appointment.

## 2024-08-20 NOTE — CATH LAB
Electrophysiology Procedure Note  [facility]    Procedures Performed:  Pulmonary Vein Isolation  Additional ablation for atrial fibrillation times one, posterior wall  Ablation of additional arrhythmia times two, focal AT from left anterior , RA isthmus ablation  Intracardiac Echocardiography  Three-dimensional intracardiac mapping  Trans-esophageal echocardiogram    Electrophysiologist: Luis Alberto Regalado M.D.    Assistant(s): None    Anesthesia: General anesthesia,  Dr. Black     Statement of Medical Necessity: Persistent atrial fibrillation    Pre-procedure ECG:  atrial fibrillation    Post-procedure ECG:  sinus rhythm    Description of Procedure:    Access and catheter placement:   After obtaining informed written consent, the patient was  brought to the EP lab in the fasting, non-sedated stated. The patient was sedated and intubated.   An arterial line was placed in the radial artery by the anesthesiologist.   ROCÍO was performed to rule out left atrial appendage thrombus.   The patient was prepped and draped in the usual sterile fashion.   Using the modified Seldinger technique, access was obtained in the right femoral vein.   Guidewires were advanced into the IVC. In the right femoral vein, 3 sheaths (6, 8, 8 Fr  and one sheath  upsized  to  12 Fr) were placed.  A deflectable decapolar catheter was advanced  to the coronary sinus.   An 8F intracardiac echo catheter was advanced to the right atrium for monitoring and transseptal catheterization.   Through the 12F sheath, a long wire was advanced to the SVC. The short sheath was  changed out for a Versacross sheath which was advanced to the SVC. Under intracardiac echocardiographic guidance, the sheath/wire   system was withdrawn to the fossa ovalis. A bolus of Heparin was given when the initial sheaths were placed.  Additional boluses of heparin were given as needed to keep -350 sec during LA dwell time.   The wire was advanced out of the  "dilator, and RF energy applied via the RF wire.   ICE visualized the needle passage through the fossa ovalis into the left atrium.  The dilator was advanced over the wire into the left atrium, and then the sheath was advanced over the dilator.   The dilator and sheath were removed and over the wire, a 16F sheath (DigiscendriMonkeysee) was advanced into the left atrium.   The sheath was flushed and connected to a continuous heparinized saline drip.     A multipolar Octaray catheter (Posto7-[employer]) was advanced through the sheath into the left atrium.   A 3-dimensional electroanatomical map was constructed using the CARTO system. The [place]adeview-ray was removed.  The Farawave catheter was advanced into the left atrium over a Dunn J tip wire.   Patient pretreated with glycopyrrolate.    PULMONARY VEIN ISOLATION  The Farawave catheter was positioned at the ostium of the LSPV. In \"basket\" configuration, 2 applications of 2,000V PF energy were applied. The basket was rotated 36 degree and 2 more applications of 2,000V PF energy were applied. Next, in \"flower\" configuration, 2 applications of 2,000V PF energy were applied. The flower was rotated 36 degree and 2 more applications of 2,000V PF energy were applied.  The Farawave catheter was then positioned at the ostium of the LIPV. In \"basket\" configuration, 2 applications of 2,000V PF energy were applied. The basket was rotated 36 degree and 2 more applications of 2,000V PF energy were applied. Next, in \"flower\" configuration, 2 applications of 2,000V PF energy were applied. The flower was rotated 36 degree and 2 more applications of 2,000V PF energy were applied.  The Farawave catheter was next positioned at the ostium of the RIPV. In \"basket\" configuration, 2 applications of 2,000V PF energy were applied. The basket was rotated 36 degree and 2 more applications of 2,000V PF energy were applied. Next, in \"flower\" configuration, 2 applications of 2,000V PF energy were applied. The " "flower was rotated 36 degree and 2 more applications of 2,000V PF energy were applied.  The Farawave catheter was then positioned at the ostium of the RSPV. In \"basket\" configuration, 2 applications of 2,000V PF energy were applied. The basket was rotated 36 degree and 2 more applications of 2,000V PF energy were applied. Next, in \"flower\" configuration, 2 applications of 2,000V PF energy were applied. The flower was rotated 36 degree and 2 more applications of 2,000V PF energy were applied.  Extra PF energy pulses performed if required for isolation of veins.    POSTERIOR WALL ISOLATION   The posterior wall was targeted for ablation for as an additional target for isolation for treatment of atrial fibrillation.  In \"flower\" configuration, the posterior wall was targeted for delivery of overlapping PF lesions. Lesions were delivered at superior anchor locations.    DC cardioversion.  Inducible focal LA tachycardia anterior     ADDITIONAL ABLATION OF ARRHYTHMIA(S)  RFA at focal AT at 30 watt with BiosuControl QDOT F/J with termination.  RFA RA isthmus at 40 irizarry with bilateral isthmus block    MAPPING  Post-procedural mapping was performed with the Octaray catheter demonstrating isolation of all pulmonary veins and posterior wall.Block at RA isthmus  Post ablation stimulation non inducible    ICE visualization  of the pericardium confirmed no pericardial effusion at the end of the case.   The patient was awakened from anesthesia, catheters and sheaths were removed, Hemostasis was obtained using vascades and perclose.    Electrophysiological Findings:  Atrial fibrillation RR  937 msec  Intervals- A-H 110 msec  H-V 49 msec   msec   msec   msec        Total RF time: 415 sec  Total PF applications: 72  Fluoro time: 225 mGy    Estimated blood loss - 30 mL    Complications: None    Impression:  1. Atrial fibrillation, persistent  2. Successful isolation of all four pulmonary veins and posterior wall with " PFA  3. Focal LA tachycardia ablated.  4. RA isthmus ablation    Recommendations:  1. Bed rest for 2 hours  2. Telemetry monitoring during recovery      Luis Alberto Regalado MD  Cardiac Electrophysiology

## 2024-08-20 NOTE — ANESTHESIA PROCEDURE NOTES
Airway    Date/Time: 8/20/2024 10:21 AM    Performed by: Mara Black M.D.  Authorized by: Mara Black M.D.    Location:  OR  Urgency:  Elective  Indications for Airway Management:  Anesthesia      Spontaneous Ventilation: absent    Sedation Level:  Deep  Preoxygenated: Yes    Patient Position:  Sniffing  Mask Difficulty Assessment:  1 - vent by mask  Final Airway Type:  Endotracheal airway  Final Endotracheal Airway:  ETT  Cuffed: Yes    Technique Used for Successful ETT Placement:  Direct laryngoscopy  Devices/Methods Used in Placement:  Anterior pressure/BURP    Insertion Site:  Oral  Blade Type:  Andres  Laryngoscope Blade/Videolaryngoscope Blade Size:  4  ETT Size (mm):  8.0  Measured from:  Teeth  ETT to Teeth (cm):  24  Placement Verified by: auscultation and capnometry    Cormack-Lehane Classification:  Grade IIb - view of arytenoids or posterior of glottis only  Number of Attempts at Approach:  1

## 2024-08-20 NOTE — ANESTHESIA PREPROCEDURE EVALUATION
Date/Time: 08/20/24 1030    Scheduled providers: Luis Alberto Regalado M.D.; Mara Black M.D.    Procedure: CL-EP ABLATION ATRIAL FIBRILLATION    Diagnosis: Atrial fibrillation, unspecified type (HCC) [I48.91]    Indications: See Associated Dx    Location: [facility] - Cath Lab - [facility]            Relevant Problems   ANESTHESIA   (positive) GERI (obstructive sleep apnea)      CARDIAC   (positive) Atrial fibrillation (HCC)   (positive) Hypertension   (positive) Thoracic aortic aneurysm (TAA) (HCC)       Physical Exam    Airway   Mallampati: II  TM distance: >3 FB  Neck ROM: full       Cardiovascular - normal exam  Rhythm: irregular  Rate: normal  (-) murmur     Dental - normal exam           Pulmonary - normal exam  Breath sounds clear to auscultation     Abdominal   (+) obese     Neurological - normal exam         Other findings: No loose teeth              Anesthesia Plan    ASA 3   ASA physical status 3 criteria: a thrombophilic disease requiring anticoagulation and morbid obesity - BMI greater than or equal to 40    Plan - general       Airway plan will be ETT  ROCÍO Planned        Induction: intravenous    Postoperative Plan: Postoperative administration of opioids is intended.    Pertinent diagnostic labs and testing reviewed    Informed Consent:    Anesthetic plan and risks discussed with patient.    Use of blood products discussed with: patient whom consented to blood products.

## 2024-08-20 NOTE — H&P
Physician H&P    Patient ID:  George Stoll  [id number]  79 y.o. male  1945    History:  Primary Diagnosis: Atrial fibrillation persistent and typical atrial flutter    HPI:  For ablation. Symptomatic. Previous cardioversion without AA. Obesity, sleep apnea. TAVR in [facility] 11/22. Previous typical atrial flutter    Past Medical History:  has a past medical history of Anesthesia, Arrhythmia, Arthritis, Breath shortness, Cataract, Heart murmur, Heart valve disease, Hypertension, Obesity, PONV (postoperative nausea and vomiting), and Sleep apnea.    He has no past medical history of Acute nasopharyngitis, Anginal syndrome (HCC), Asthma, Blood clotting disorder (HCC), Bowel habit changes, Cancer (HCC), Carcinoma in situ of respiratory system, Congestive heart failure (HCC), Continuous ambulatory peritoneal dialysis status (HCC), Coughing blood, Dental disorder, Diabetes (HCC), Dialysis patient (HCC), Disorder of thyroid, Emphysema of lung (HCC), Glaucoma, Gynecological disorder, Heart burn, Hemorrhagic disorder (HCC), Hepatitis A, Hepatitis B, Hepatitis C, Hiatus hernia syndrome, High cholesterol, Indigestion, Infectious disease, Jaundice, Myocardial infarct (HCC), Pacemaker, Pain, Pneumonia, Pregnant, Renal disorder, Rheumatic fever, Seizure (HCC), Snoring, Stroke (HCC), Tuberculosis, Urinary bladder disorder, or Urinary incontinence.  Past Surgical History:  has a past surgical history that includes uvulopharyngopalatoplasty; tonsillectomy; pr remv 2nd cataract,corn-scler sectn; arthroscopy, knee; other cardiac surgery (11/23/2022); other orthopedic surgery (Many); orif, elbow (fracture - [place]); hand surgery (3/2020); orif, knee (right 1/2016  - 5/2015); orif, ankle (1980); and foot surgery (1980).  Past Social History:  reports that he quit smoking about 55 years ago. His smoking use included cigarettes. He started smoking about 57 years ago. He has a 0.5 pack-year smoking history. He has been  "exposed to tobacco smoke. He has never used smokeless tobacco. He reports that he does not drink alcohol and does not use drugs.  Past Family History:   Family History   Problem Relation Age of Onset    Heart Disease Mother          Congestive Heart Failure at 52 years old    Hypertension Mother     Heart Disease Father         Pace Maker    Hypertension Father     Other Brother     Sleep Apnea Brother     Heart Disease Brother         Pace Maker/Defibrillator    Hypertension Brother      Allergies: Hydrochlorothiazide, Sulfa drugs, and Thiazide [hydrochlorothiazide w-triamterene]    Current Medications:  Prior to Admission medications    Medication Sig Start Date End Date Taking? Authorizing Provider   furosemide (LASIX) 40 MG Tab 40 mg every day. DO NOT TAKE DAY OF SURGERY   Yes Physician Outpatient   apixaban (ELIQUIS) 5mg Tab Refer to Cardiologist for instructions   Yes Physician Outpatient   losartan (COZAAR) 100 MG Tab Take 25 mg by mouth every day. MEDICATION INSTRUCTIONS FOR SURGERY/PROCEDURE SCHEDULED FOR    DO NOT TAKE 24 HOURS PRIOR TO SURGERY   Yes Physician Outpatient   Ascorbic Acid (VITAMIN C) 1000 MG Tab Take  by mouth. MEDICATION INSTRUCTIONS FOR SURGERY/PROCEDURE SCHEDULED FOR    DO NOT TAKE 7 DAYS PRIOR TO SURGERY   Yes Physician Outpatient   omeprazole (PRILOSEC) 20 MG delayed-release capsule Take 20 mg by mouth every day. MEDICATION INSTRUCTIONS FOR SURGERY/PROCEDURE SCHEDULED FOR    OK TO CONTINUE TAKING PRIOR TO SURGERY AND DAY OF SURGERY   Yes Physician Outpatient   methylPREDNISolone (MEDROL DOSEPAK) 4 MG Tablet Therapy Pack Follow schedule on package instructions.  Patient not taking: Reported on 2024   Nan Carlisle P.A.-C.       Review of Systems:  ROS  BP (!) 154/82   Pulse 65   Temp 36.2 °C (97.1 °F) (Temporal)   Resp 20   Ht 1.727 m (5' 8\")   Wt (!) 133 kg (293 lb 10.4 oz)   SpO2 96%     Physical Examination:  Physical Exam  Constitutional:       " Appearance: Normal appearance. He is well-developed.   HENT:      Mouth/Throat:      Mouth: Oropharynx is clear and moist.   Eyes:      Extraocular Movements: EOM normal.      Conjunctiva/sclera: Conjunctivae normal.   Neck:      Thyroid: No thyroid mass.      Vascular: No JVD.   Cardiovascular:      Rate and Rhythm: Normal rate. Rhythm irregular.      Chest Wall: PMI is not displaced.      Pulses: Normal pulses.           Carotid pulses are 2+ on the right side and 2+ on the left side.       Radial pulses are 2+ on the right side and 2+ on the left side.        Femoral pulses are 2+ on the right side and 2+ on the left side.       Dorsalis pedis pulses are 2+ on the right side and 2+ on the left side.      Heart sounds: S1 normal and S2 normal. No murmur heard.     No gallop.      Comments: No peripheral edema.  Pulmonary:      Effort: Pulmonary effort is normal.      Breath sounds: Normal breath sounds.   Abdominal:      General: There is no abdominal bruit.      Palpations: Abdomen is soft.      Tenderness: There is no abdominal tenderness.   Musculoskeletal:         General: No edema. Normal range of motion.      Thoracic back: No spasms or tenderness.   Skin:     Findings: No rash.      Nails: There is no clubbing.   Neurological:      Mental Status: He is alert and oriented to person, place, and time.   Psychiatric:         Mood and Affect: Mood and affect normal.         Impression:  Atrial fibrillation/flutter for ablation.  The risks, benefits,and alternatives to atrial fibrillation ablation with general anesthesia were discussed in great detail. Specific risks mentioned including bleeding, infection, arteriovenous fistula/pseudoaneurysm related to sheath placement, cardiac perforation with possible tamponade requiring pericardiocentesis or possible open heart surgery were discussed. In addition,we discussed atrial fibrillation ablation specific complications including pulmonary vein stenosis, left atrial  perforation (2-4%), and nerve damage involving the recurrent laryngeal nerve, the vagus nerve with resulting gastroparesis, and the phrenic nerve.  Also mentioned was a 1/400 (0.25%) occurrence of atrial esophageal fistula, which is an abnormal communication between the esophagus and the left atrium; this complication is often fatal. Lastly the risks of death, myocardial infarction, stroke, DVT and PE were discussed. The patient verbalized understanding of these potential complications and wishes to proceed with this procedure.  The risks, benefits, and alternatives to transesophageal echocardiogram with IV sedation were discussed with the patient in specific detail, including oropharyngeal and esophageal traumas including hoarseness and dysphagia after the procedure. Rare cases demonstrating serious or fatal complications associated with transesophageal echocardiogram have been reported in the adult population, including cardiac, pulmonary and bleeding complications in less than 1% of people. Patients with an identified intracardiac thrombus are at increased risk for embolic events and this appears to be reduced with anticoagulant therapy. The patient verbalized understandings about these  possible complications and wishes to proceed with this procedure

## 2024-08-21 LAB — LV EJECT FRACT  99904: 60

## 2024-08-21 NOTE — OR NURSING
1244 Pt over from cath lab post cardiac ablation. Right groin site is clean, dry and soft, no signs of bleeding or hematoma. Pt awakens to voice, denies pain or nausea. VSS.  1308 EKG at bedside  1330 Family to bedside  1340 Tolerating orals  1445 Taylor APRN to bedside  1530 3 hour bedrest complete, pt ambulated on the unit, tolerated well. Right groin site is clean, dry and soft, no signs of bleeding or hematoma  1620 Discharge instructions provided to pt and spouse. Discussed diet, activity, follow up, prescriptions and symptom management. Pt and spouse state understanding. Pt and spouse state all questions have been answered. Copy of discharge provided to pt. Signed copy in chart.   1640 Criteria met, right groin site is clean, dry and soft, no signs of bleeding or hematom  1650 Pt wheeled off of unit with all belongings by RN without incident.

## 2024-09-10 ENCOUNTER — PHARMACY VISIT (OUTPATIENT)
Dept: PHARMACY | Facility: MEDICAL CENTER | Age: 79
End: 2024-09-10
Payer: COMMERCIAL

## 2024-09-10 ENCOUNTER — OFFICE VISIT (OUTPATIENT)
Dept: CARDIOLOGY | Facility: MEDICAL CENTER | Age: 79
End: 2024-09-10
Attending: NURSE PRACTITIONER
Payer: MEDICARE

## 2024-09-10 VITALS
DIASTOLIC BLOOD PRESSURE: 84 MMHG | RESPIRATION RATE: 16 BRPM | WEIGHT: 290 LBS | HEIGHT: 67 IN | BODY MASS INDEX: 45.52 KG/M2 | SYSTOLIC BLOOD PRESSURE: 122 MMHG | HEART RATE: 74 BPM | OXYGEN SATURATION: 95 %

## 2024-09-10 DIAGNOSIS — Z98.890 S/P ABLATION OF ATRIAL FIBRILLATION: ICD-10-CM

## 2024-09-10 DIAGNOSIS — I48.91 ATRIAL FIBRILLATION, UNSPECIFIED TYPE (HCC): ICD-10-CM

## 2024-09-10 DIAGNOSIS — I10 PRIMARY HYPERTENSION: ICD-10-CM

## 2024-09-10 DIAGNOSIS — Z86.79 S/P ABLATION OF ATRIAL FIBRILLATION: ICD-10-CM

## 2024-09-10 DIAGNOSIS — Z95.2 HISTORY OF TRANSCATHETER AORTIC VALVE REPLACEMENT (TAVR): ICD-10-CM

## 2024-09-10 DIAGNOSIS — Z79.01 ANTICOAGULATED: ICD-10-CM

## 2024-09-10 DIAGNOSIS — G47.33 OSA (OBSTRUCTIVE SLEEP APNEA): ICD-10-CM

## 2024-09-10 LAB — EKG IMPRESSION: NORMAL

## 2024-09-10 PROCEDURE — 99214 OFFICE O/P EST MOD 30 MIN: CPT | Performed by: NURSE PRACTITIONER

## 2024-09-10 PROCEDURE — RXMED WILLOW AMBULATORY MEDICATION CHARGE: Performed by: NURSE PRACTITIONER

## 2024-09-10 PROCEDURE — 93005 ELECTROCARDIOGRAM TRACING: CPT | Performed by: NURSE PRACTITIONER

## 2024-09-10 PROCEDURE — 3079F DIAST BP 80-89 MM HG: CPT | Performed by: NURSE PRACTITIONER

## 2024-09-10 PROCEDURE — 99212 OFFICE O/P EST SF 10 MIN: CPT | Performed by: NURSE PRACTITIONER

## 2024-09-10 PROCEDURE — 93010 ELECTROCARDIOGRAM REPORT: CPT | Performed by: INTERNAL MEDICINE

## 2024-09-10 PROCEDURE — 3074F SYST BP LT 130 MM HG: CPT | Performed by: NURSE PRACTITIONER

## 2024-09-10 ASSESSMENT — ENCOUNTER SYMPTOMS
PND: 0
LOSS OF CONSCIOUSNESS: 0
WHEEZING: 0
ORTHOPNEA: 0
SENSORY CHANGE: 0
SPEECH CHANGE: 0
STRIDOR: 0
HEARTBURN: 0
TREMORS: 0
TINGLING: 0
NAUSEA: 0
SHORTNESS OF BREATH: 0
CHILLS: 0
PALPITATIONS: 0
FEVER: 0
HEADACHES: 0
SPUTUM PRODUCTION: 0
VOMITING: 0
WEIGHT LOSS: 0
DIZZINESS: 0
HEMOPTYSIS: 0
FOCAL WEAKNESS: 0
SORE THROAT: 0
COUGH: 0

## 2024-09-10 ASSESSMENT — FIBROSIS 4 INDEX: FIB4 SCORE: 1.98

## 2024-09-10 NOTE — PROGRESS NOTES
Chief Complaint   Patient presents with    Atrial Fibrillation    Follow-Up       Subjective     George Stoll is a 79 y.o. male who presents today for procedural follow-up after ablation of persistent atrial fibrillation and flutter by Dr. Luis Alberto Regalado 2024.    Procedure completed with PFA pulmonary vein isolation, posterior wall isolation, RFA of LA focal AT and RA isthmus.    He is followed by Dr. Luis Alberto Regalado for EP.  Additional medical history significant for prior TAVR approximately 2 years ago, hypertension, GERI.    Today in follow-up he is accompanied by his wife Yaneli.  Both report that he has done well post ablation.  He has not had any known episodes of A-fib.  Checks cardia twice a day.  No worsening shortness of breath, no chest pain.  No peripheral edema.  Brought his blood pressure heart rate log to review.  Majority of blood pressures are well-controlled, no significant bradycardia on heart rate log.    Past Medical History:   Diagnosis Date    Anesthesia     Nausea    Arrhythmia     Arthritis     Breath shortness     Cataract     Heart murmur     Heart valve disease     Hypertension     Obesity     PONV (postoperative nausea and vomiting)     Sleep apnea      Past Surgical History:   Procedure Laterality Date    ARTHROSCOPY, KNEE      FOOT SURGERY      HAND SURGERY  3/2020    ORIF, ANKLE  1980    ORIF, ELBOW  fracture - [facility]    ORIF, KNEE  right 2016  - 2015    left 2019    OTHER CARDIAC SURGERY  2022    Aortic Value Replacement    OTHER ORTHOPEDIC SURGERY  Many    [place] REMV 2ND CATARACT,CORN-SCLER SECTN      TONSILLECTOMY      UVULOPHARYNGOPALATOPLASTY       Family History   Problem Relation Age of Onset    Heart Disease Mother          Congestive Heart Failure at 52 years old    Hypertension Mother     Heart Disease Father         Pace Maker    Hypertension Father     Other Brother     Sleep Apnea Brother     Heart Disease Brother         Pace  Maker/Defibrillator    Hypertension Brother      Social History     Socioeconomic History    Marital status:      Spouse name: Not on file    Number of children: Not on file    Years of education: Not on file    Highest education level: Not on file   Occupational History    Not on file   Tobacco Use    Smoking status: Former     Current packs/day: 0.00     Average packs/day: 0.3 packs/day for 2.0 years (0.5 ttl pk-yrs)     Types: Cigarettes     Start date: 11/15/1966     Quit date: 11/15/1968     Years since quittin.8     Passive exposure: Past    Smokeless tobacco: Never   Vaping Use    Vaping status: Never Used   Substance and Sexual Activity    Alcohol use: Never    Drug use: No    Sexual activity: Not on file   Other Topics Concern    Not on file   Social History Narrative    Not on file     Social Determinants of Health     Financial Resource Strain: Not on file   Food Insecurity: Not on file   Transportation Needs: Not on file   Physical Activity: Not on file   Stress: Not on file   Social Connections: Not on file   Intimate Partner Violence: Not on file   Housing Stability: Not on file     Allergies   Allergen Reactions    Hydrochlorothiazide Unspecified     pancreatitis    Sulfa Drugs Vomiting and Nausea    Thiazide [Hydrochlorothiazide W-Triamterene]      Outpatient Encounter Medications as of 9/10/2024   Medication Sig Dispense Refill    methylPREDNISolone (MEDROL DOSEPAK) 4 MG Tablet Therapy Pack Follow schedule on package instructions. (Patient not taking: Reported on 2024) 21 Tablet 0    furosemide (LASIX) 40 MG Tab 40 mg every day. DO NOT TAKE DAY OF SURGERY      apixaban (ELIQUIS) 5mg Tab Refer to Cardiologist for instructions      losartan (COZAAR) 100 MG Tab Take 25 mg by mouth every day. MEDICATION INSTRUCTIONS FOR SURGERY/PROCEDURE SCHEDULED FOR    DO NOT TAKE 24 HOURS PRIOR TO SURGERY      Ascorbic Acid (VITAMIN C) 1000 MG Tab Take  by mouth. MEDICATION INSTRUCTIONS FOR  "SURGERY/PROCEDURE SCHEDULED FOR 8/20   DO NOT TAKE 7 DAYS PRIOR TO SURGERY      omeprazole (PRILOSEC) 20 MG delayed-release capsule Take 20 mg by mouth every day. MEDICATION INSTRUCTIONS FOR SURGERY/PROCEDURE SCHEDULED FOR 8/20   OK TO CONTINUE TAKING PRIOR TO SURGERY AND DAY OF SURGERY       No facility-administered encounter medications on file as of 9/10/2024.     Review of Systems   Constitutional:  Negative for chills, fever, malaise/fatigue and weight loss.   HENT:  Negative for congestion, sore throat and tinnitus.    Respiratory:  Negative for cough, hemoptysis, sputum production, shortness of breath, wheezing and stridor.    Cardiovascular:  Negative for chest pain, palpitations, orthopnea, leg swelling and PND.   Gastrointestinal:  Negative for heartburn, nausea and vomiting.   Neurological:  Negative for dizziness, tingling, tremors, sensory change, speech change, focal weakness, loss of consciousness and headaches.        Objective     /84 (BP Location: Left arm, Patient Position: Sitting, BP Cuff Size: Adult)   Pulse 74   Resp 16   Ht 1.702 m (5' 7\")   Wt (!) 132 kg (290 lb)   SpO2 95%   BMI 45.42 kg/m²     Physical Exam  Constitutional:       Appearance: Normal appearance. He is well-developed.   HENT:      Head: Normocephalic and atraumatic.      Mouth/Throat:      Mouth: Mucous membranes are moist.      Pharynx: Oropharynx is clear.   Eyes:      Extraocular Movements: Extraocular movements intact.      Conjunctiva/sclera: Conjunctivae normal.      Pupils: Pupils are equal, round, and reactive to light.   Neck:      Vascular: No JVD.   Cardiovascular:      Rate and Rhythm: Normal rate and regular rhythm.      Pulses: Normal pulses.      Heart sounds: Normal heart sounds. No murmur heard.     No friction rub. No gallop.   Pulmonary:      Effort: Pulmonary effort is normal.      Breath sounds: Normal breath sounds. No wheezing or rales.   Chest:      Chest wall: No tenderness. "   Musculoskeletal:         General: Normal range of motion.      Cervical back: Normal range of motion and neck supple.      Right lower leg: No edema.      Left lower leg: No edema.   Skin:     General: Skin is warm and dry.      Capillary Refill: Capillary refill takes 2 to 3 seconds.   Neurological:      Mental Status: He is alert and oriented to person, place, and time.   Psychiatric:         Mood and Affect: Mood normal.         Behavior: Behavior normal.         Thought Content: Thought content normal.         Judgment: Judgment normal.          Assessment & Plan     1. Atrial fibrillation, unspecified type (HCC)  EKG    apixaban (ELIQUIS) 5mg Tab      2. S/P ablation of atrial fibrillation, 8/20/24        3. Primary hypertension        4. History of transcatheter aortic valve replacement (TAVR)        5. Anticoagulated        6. GERI (obstructive sleep apnea)            Medical Decision Making: Today's Assessment/Status/Plan:   1.  Persistent atrial fibrillation  2.  Status post ablation  - Ablation 8/20/2024 with PFA of the pulmonary veins and posterior wall, RFA of left atrial focal AT and RA isthmus with noted block.  -Clinically doing well in the few weeks post ablation.  No known recurrences.  On twelve-lead ECG is sinus today.  - We reviewed post procedure healing and when to contact the office if develops recurrent arrhythmia.   -He will continue Eliquis 5 mg twice daily.  He is not on david agents or antiarrhythmics.    3.  Hypertension  - Blood pressure overall well-controlled.  He will continue losartan 25 mg.    4.  History of TAVR  - ROCÍO completed with ablation showed aortic valve replacement working normally without paravalvular leak.  -She follows with Dr. Biju Sanchez, has follow-up arranged.    5.  Anticoagulation  - Reports no evidence of bleeding on Eliquis 5 mg twice daily to continue indefinitely.  [facility].    6.  GERI    Return to clinic  in December as scheduled with Dr. Regalado, syndrome clinical condition changes.  He will keep his follow-ups with his regular cardiologist.  PLEASE NOTE: This Note was created using voice recognition Software. I have made every reasonable attempt to correct obvious errors, but I expect that there are errors of grammar and possibly content that I did not discover before finalizing the note

## 2024-09-12 ENCOUNTER — TELEPHONE (OUTPATIENT)
Dept: CARDIOLOGY | Facility: MEDICAL CENTER | Age: 79
End: 2024-09-12
Payer: MEDICARE

## 2024-09-12 DIAGNOSIS — I48.91 ATRIAL FIBRILLATION, UNSPECIFIED TYPE (HCC): ICD-10-CM

## 2024-10-21 ENCOUNTER — HOME STUDY (OUTPATIENT)
Dept: SLEEP MEDICINE | Facility: MEDICAL CENTER | Age: 79
End: 2024-10-21
Attending: NURSE PRACTITIONER
Payer: MEDICARE

## 2024-10-21 DIAGNOSIS — G47.33 OSA (OBSTRUCTIVE SLEEP APNEA): ICD-10-CM

## 2024-10-21 PROCEDURE — 94762 N-INVAS EAR/PLS OXIMTRY CONT: CPT | Performed by: INTERNAL MEDICINE

## 2024-10-24 PROBLEM — G56.02 LEFT CARPAL TUNNEL SYNDROME: Status: ACTIVE | Noted: 2024-10-24

## 2024-10-24 ASSESSMENT — FIBROSIS 4 INDEX: FIB4 SCORE: 1.98

## 2024-10-29 ENCOUNTER — OFFICE VISIT (OUTPATIENT)
Dept: SLEEP MEDICINE | Facility: MEDICAL CENTER | Age: 79
End: 2024-10-29
Attending: NURSE PRACTITIONER
Payer: MEDICARE

## 2024-10-29 VITALS
DIASTOLIC BLOOD PRESSURE: 84 MMHG | OXYGEN SATURATION: 95 % | RESPIRATION RATE: 16 BRPM | SYSTOLIC BLOOD PRESSURE: 124 MMHG | WEIGHT: 291 LBS | HEART RATE: 82 BPM | HEIGHT: 68 IN | BODY MASS INDEX: 44.1 KG/M2

## 2024-10-29 DIAGNOSIS — G47.33 OSA (OBSTRUCTIVE SLEEP APNEA): ICD-10-CM

## 2024-10-29 DIAGNOSIS — Z23 NEED FOR VACCINATION: ICD-10-CM

## 2024-10-29 DIAGNOSIS — Z87.891 FORMER SMOKER: ICD-10-CM

## 2024-10-29 PROCEDURE — 99213 OFFICE O/P EST LOW 20 MIN: CPT | Performed by: NURSE PRACTITIONER

## 2024-10-29 PROCEDURE — 90471 IMMUNIZATION ADMIN: CPT

## 2024-10-29 ASSESSMENT — FIBROSIS 4 INDEX: FIB4 SCORE: 1.98

## 2024-11-15 ENCOUNTER — APPOINTMENT (OUTPATIENT)
Dept: ADMISSIONS | Facility: MEDICAL CENTER | Age: 79
End: 2024-11-15
Attending: SURGERY
Payer: MEDICARE

## 2024-11-21 ENCOUNTER — PRE-ADMISSION TESTING (OUTPATIENT)
Dept: ADMISSIONS | Facility: MEDICAL CENTER | Age: 79
End: 2024-11-21
Attending: SURGERY
Payer: MEDICARE

## 2024-11-21 NOTE — OR NURSING
FOR SURGERY ON: 11/27/24  Patient provided medication instructions per [facility]'s Guideline for Pre-Operative Medication Management. Preparing For Your Procedure packet reviewed with patient. Encouraged patient to increase oral intake the day prior to procedure including intake of electrolyte drinks such as Gatorade or electrolyte water and may have 16 oz of clear liquids up to 2 hours prior to surgery, unless otherwise directed by the surgeon. Patient advised to contact surgeon with any additional questions or concerns.     Patient verbalized understanding of their instructions.  Patient's medication instructions sent to the email address on file.    GERI - Patient instructed to bring device DOS    Fall risk    Adverse reaction to anesthesia: ponv    Patient declines preadmit testing appt. Please do DOS.

## 2024-11-26 ENCOUNTER — ANESTHESIA EVENT (OUTPATIENT)
Dept: SURGERY | Facility: MEDICAL CENTER | Age: 79
End: 2024-11-26
Payer: MEDICARE

## 2024-11-27 ENCOUNTER — HOSPITAL ENCOUNTER (OUTPATIENT)
Facility: MEDICAL CENTER | Age: 79
End: 2024-11-27
Attending: SURGERY | Admitting: SURGERY
Payer: MEDICARE

## 2024-11-27 ENCOUNTER — ANESTHESIA (OUTPATIENT)
Dept: SURGERY | Facility: MEDICAL CENTER | Age: 79
End: 2024-11-27
Payer: MEDICARE

## 2024-11-27 VITALS
OXYGEN SATURATION: 90 % | HEART RATE: 76 BPM | HEIGHT: 68 IN | BODY MASS INDEX: 45.54 KG/M2 | RESPIRATION RATE: 20 BRPM | WEIGHT: 300.49 LBS | SYSTOLIC BLOOD PRESSURE: 113 MMHG | DIASTOLIC BLOOD PRESSURE: 59 MMHG | TEMPERATURE: 97.8 F

## 2024-11-27 DIAGNOSIS — G56.02 LEFT CARPAL TUNNEL SYNDROME: ICD-10-CM

## 2024-11-27 LAB
ANION GAP SERPL CALC-SCNC: 12 MMOL/L (ref 7–16)
BUN SERPL-MCNC: 11 MG/DL (ref 8–22)
CALCIUM SERPL-MCNC: 9 MG/DL (ref 8.5–10.5)
CHLORIDE SERPL-SCNC: 106 MMOL/L (ref 96–112)
CO2 SERPL-SCNC: 23 MMOL/L (ref 20–33)
CREAT SERPL-MCNC: 0.69 MG/DL (ref 0.5–1.4)
ERYTHROCYTE [DISTWIDTH] IN BLOOD BY AUTOMATED COUNT: 42.4 FL (ref 35.9–50)
GFR SERPLBLD CREATININE-BSD FMLA CKD-EPI: 95 ML/MIN/1.73 M 2
GLUCOSE SERPL-MCNC: 95 MG/DL (ref 65–99)
HCT VFR BLD AUTO: 42.4 % (ref 42–52)
HGB BLD-MCNC: 15.1 G/DL (ref 14–18)
MCH RBC QN AUTO: 32.8 PG (ref 27–33)
MCHC RBC AUTO-ENTMCNC: 35.6 G/DL (ref 32.3–36.5)
MCV RBC AUTO: 92 FL (ref 81.4–97.8)
PLATELET # BLD AUTO: 169 K/UL (ref 164–446)
PMV BLD AUTO: 10.8 FL (ref 9–12.9)
POTASSIUM SERPL-SCNC: 3.8 MMOL/L (ref 3.6–5.5)
RBC # BLD AUTO: 4.61 M/UL (ref 4.7–6.1)
SODIUM SERPL-SCNC: 141 MMOL/L (ref 135–145)
WBC # BLD AUTO: 6.3 K/UL (ref 4.8–10.8)

## 2024-11-27 PROCEDURE — 85027 COMPLETE CBC AUTOMATED: CPT

## 2024-11-27 PROCEDURE — 700101 HCHG RX REV CODE 250: Performed by: SURGERY

## 2024-11-27 PROCEDURE — 700101 HCHG RX REV CODE 250: Performed by: STUDENT IN AN ORGANIZED HEALTH CARE EDUCATION/TRAINING PROGRAM

## 2024-11-27 PROCEDURE — 700111 HCHG RX REV CODE 636 W/ 250 OVERRIDE (IP): Mod: JZ | Performed by: STUDENT IN AN ORGANIZED HEALTH CARE EDUCATION/TRAINING PROGRAM

## 2024-11-27 PROCEDURE — 700105 HCHG RX REV CODE 258: Performed by: STUDENT IN AN ORGANIZED HEALTH CARE EDUCATION/TRAINING PROGRAM

## 2024-11-27 PROCEDURE — 80048 BASIC METABOLIC PNL TOTAL CA: CPT

## 2024-11-27 PROCEDURE — 160002 HCHG RECOVERY MINUTES (STAT): Performed by: SURGERY

## 2024-11-27 PROCEDURE — 160048 HCHG OR STATISTICAL LEVEL 1-5: Performed by: SURGERY

## 2024-11-27 PROCEDURE — 160009 HCHG ANES TIME/MIN: Performed by: SURGERY

## 2024-11-27 PROCEDURE — 26055 INCISE FINGER TENDON SHEATH: CPT | Mod: F3 | Performed by: SURGERY

## 2024-11-27 PROCEDURE — 160028 HCHG SURGERY MINUTES - 1ST 30 MINS LEVEL 3: Performed by: SURGERY

## 2024-11-27 PROCEDURE — 160025 RECOVERY II MINUTES (STATS): Performed by: SURGERY

## 2024-11-27 PROCEDURE — 700102 HCHG RX REV CODE 250 W/ 637 OVERRIDE(OP): Performed by: STUDENT IN AN ORGANIZED HEALTH CARE EDUCATION/TRAINING PROGRAM

## 2024-11-27 PROCEDURE — 700111 HCHG RX REV CODE 636 W/ 250 OVERRIDE (IP): Performed by: SURGERY

## 2024-11-27 PROCEDURE — 36415 COLL VENOUS BLD VENIPUNCTURE: CPT

## 2024-11-27 PROCEDURE — 160039 HCHG SURGERY MINUTES - EA ADDL 1 MIN LEVEL 3: Performed by: SURGERY

## 2024-11-27 PROCEDURE — 24359 REPAIR ELBOW DEB/ATTCH OPEN: CPT | Mod: LT | Performed by: SURGERY

## 2024-11-27 PROCEDURE — 160035 HCHG PACU - 1ST 60 MINS PHASE I: Performed by: SURGERY

## 2024-11-27 PROCEDURE — 64718 REVISE ULNAR NERVE AT ELBOW: CPT | Mod: LT,59 | Performed by: SURGERY

## 2024-11-27 PROCEDURE — A9270 NON-COVERED ITEM OR SERVICE: HCPCS | Performed by: STUDENT IN AN ORGANIZED HEALTH CARE EDUCATION/TRAINING PROGRAM

## 2024-11-27 PROCEDURE — 160046 HCHG PACU - 1ST 60 MINS PHASE II: Performed by: SURGERY

## 2024-11-27 PROCEDURE — 64721 CARPAL TUNNEL SURGERY: CPT | Mod: LT | Performed by: SURGERY

## 2024-11-27 RX ORDER — ACETAMINOPHEN 500 MG
1000 TABLET ORAL ONCE
Status: COMPLETED | OUTPATIENT
Start: 2024-11-27 | End: 2024-11-27

## 2024-11-27 RX ORDER — LABETALOL HYDROCHLORIDE 5 MG/ML
5 INJECTION, SOLUTION INTRAVENOUS
Status: DISCONTINUED | OUTPATIENT
Start: 2024-11-27 | End: 2024-11-27 | Stop reason: HOSPADM

## 2024-11-27 RX ORDER — LIDOCAINE HYDROCHLORIDE 10 MG/ML
INJECTION, SOLUTION EPIDURAL; INFILTRATION; INTRACAUDAL; PERINEURAL
Status: DISCONTINUED
Start: 2024-11-27 | End: 2024-11-27 | Stop reason: HOSPADM

## 2024-11-27 RX ORDER — LIDOCAINE HYDROCHLORIDE 20 MG/ML
INJECTION, SOLUTION EPIDURAL; INFILTRATION; INTRACAUDAL; PERINEURAL PRN
Status: DISCONTINUED | OUTPATIENT
Start: 2024-11-27 | End: 2024-11-27 | Stop reason: SURG

## 2024-11-27 RX ORDER — DIPHENHYDRAMINE HYDROCHLORIDE 50 MG/ML
12.5 INJECTION INTRAMUSCULAR; INTRAVENOUS
Status: DISCONTINUED | OUTPATIENT
Start: 2024-11-27 | End: 2024-11-27 | Stop reason: HOSPADM

## 2024-11-27 RX ORDER — ALBUTEROL SULFATE 5 MG/ML
2.5 SOLUTION RESPIRATORY (INHALATION)
Status: DISCONTINUED | OUTPATIENT
Start: 2024-11-27 | End: 2024-11-27 | Stop reason: HOSPADM

## 2024-11-27 RX ORDER — BUPIVACAINE HYDROCHLORIDE AND EPINEPHRINE 5; 5 MG/ML; UG/ML
INJECTION, SOLUTION EPIDURAL; INTRACAUDAL; PERINEURAL
Status: DISCONTINUED
Start: 2024-11-27 | End: 2024-11-27 | Stop reason: HOSPADM

## 2024-11-27 RX ORDER — ONDANSETRON 2 MG/ML
INJECTION INTRAMUSCULAR; INTRAVENOUS PRN
Status: DISCONTINUED | OUTPATIENT
Start: 2024-11-27 | End: 2024-11-27 | Stop reason: SURG

## 2024-11-27 RX ORDER — SODIUM CHLORIDE, SODIUM LACTATE, POTASSIUM CHLORIDE, CALCIUM CHLORIDE 600; 310; 30; 20 MG/100ML; MG/100ML; MG/100ML; MG/100ML
INJECTION, SOLUTION INTRAVENOUS
Status: DISCONTINUED | OUTPATIENT
Start: 2024-11-27 | End: 2024-11-27 | Stop reason: SURG

## 2024-11-27 RX ORDER — OXYCODONE HCL 5 MG/5 ML
5 SOLUTION, ORAL ORAL
Status: COMPLETED | OUTPATIENT
Start: 2024-11-27 | End: 2024-11-27

## 2024-11-27 RX ORDER — METOPROLOL TARTRATE 1 MG/ML
1 INJECTION, SOLUTION INTRAVENOUS
Status: DISCONTINUED | OUTPATIENT
Start: 2024-11-27 | End: 2024-11-27 | Stop reason: HOSPADM

## 2024-11-27 RX ORDER — HYDROMORPHONE HYDROCHLORIDE 1 MG/ML
0.1 INJECTION, SOLUTION INTRAMUSCULAR; INTRAVENOUS; SUBCUTANEOUS
Status: DISCONTINUED | OUTPATIENT
Start: 2024-11-27 | End: 2024-11-27 | Stop reason: HOSPADM

## 2024-11-27 RX ORDER — SCOLOPAMINE TRANSDERMAL SYSTEM 1 MG/1
1 PATCH, EXTENDED RELEASE TRANSDERMAL
Status: DISCONTINUED | OUTPATIENT
Start: 2024-11-27 | End: 2024-11-27 | Stop reason: HOSPADM

## 2024-11-27 RX ORDER — BUPIVACAINE HYDROCHLORIDE AND EPINEPHRINE 5; 5 MG/ML; UG/ML
INJECTION, SOLUTION PERINEURAL
Status: DISCONTINUED | OUTPATIENT
Start: 2024-11-27 | End: 2024-11-27 | Stop reason: HOSPADM

## 2024-11-27 RX ORDER — MIDAZOLAM HYDROCHLORIDE 1 MG/ML
INJECTION INTRAMUSCULAR; INTRAVENOUS PRN
Status: DISCONTINUED | OUTPATIENT
Start: 2024-11-27 | End: 2024-11-27 | Stop reason: SURG

## 2024-11-27 RX ORDER — SODIUM CHLORIDE, SODIUM LACTATE, POTASSIUM CHLORIDE, CALCIUM CHLORIDE 600; 310; 30; 20 MG/100ML; MG/100ML; MG/100ML; MG/100ML
INJECTION, SOLUTION INTRAVENOUS CONTINUOUS
Status: DISCONTINUED | OUTPATIENT
Start: 2024-11-27 | End: 2024-11-27 | Stop reason: HOSPADM

## 2024-11-27 RX ORDER — HALOPERIDOL 5 MG/ML
1 INJECTION INTRAMUSCULAR
Status: DISCONTINUED | OUTPATIENT
Start: 2024-11-27 | End: 2024-11-27 | Stop reason: HOSPADM

## 2024-11-27 RX ORDER — OXYCODONE HCL 5 MG/5 ML
10 SOLUTION, ORAL ORAL
Status: COMPLETED | OUTPATIENT
Start: 2024-11-27 | End: 2024-11-27

## 2024-11-27 RX ORDER — MAGNESIUM SULFATE HEPTAHYDRATE 40 MG/ML
INJECTION, SOLUTION INTRAVENOUS PRN
Status: DISCONTINUED | OUTPATIENT
Start: 2024-11-27 | End: 2024-11-27 | Stop reason: SURG

## 2024-11-27 RX ORDER — DEXAMETHASONE SODIUM PHOSPHATE 4 MG/ML
INJECTION, SOLUTION INTRA-ARTICULAR; INTRALESIONAL; INTRAMUSCULAR; INTRAVENOUS; SOFT TISSUE PRN
Status: DISCONTINUED | OUTPATIENT
Start: 2024-11-27 | End: 2024-11-27 | Stop reason: SURG

## 2024-11-27 RX ORDER — KETOROLAC TROMETHAMINE 15 MG/ML
INJECTION, SOLUTION INTRAMUSCULAR; INTRAVENOUS PRN
Status: DISCONTINUED | OUTPATIENT
Start: 2024-11-27 | End: 2024-11-27 | Stop reason: SURG

## 2024-11-27 RX ORDER — HYDROMORPHONE HYDROCHLORIDE 1 MG/ML
0.4 INJECTION, SOLUTION INTRAMUSCULAR; INTRAVENOUS; SUBCUTANEOUS
Status: DISCONTINUED | OUTPATIENT
Start: 2024-11-27 | End: 2024-11-27 | Stop reason: HOSPADM

## 2024-11-27 RX ORDER — HYDROMORPHONE HYDROCHLORIDE 1 MG/ML
0.2 INJECTION, SOLUTION INTRAMUSCULAR; INTRAVENOUS; SUBCUTANEOUS
Status: DISCONTINUED | OUTPATIENT
Start: 2024-11-27 | End: 2024-11-27 | Stop reason: HOSPADM

## 2024-11-27 RX ORDER — METOCLOPRAMIDE HYDROCHLORIDE 5 MG/ML
INJECTION INTRAMUSCULAR; INTRAVENOUS PRN
Status: DISCONTINUED | OUTPATIENT
Start: 2024-11-27 | End: 2024-11-27 | Stop reason: SURG

## 2024-11-27 RX ORDER — HYDRALAZINE HYDROCHLORIDE 20 MG/ML
5 INJECTION INTRAMUSCULAR; INTRAVENOUS
Status: DISCONTINUED | OUTPATIENT
Start: 2024-11-27 | End: 2024-11-27 | Stop reason: HOSPADM

## 2024-11-27 RX ORDER — CEFAZOLIN SODIUM 1 G/3ML
3 INJECTION, POWDER, FOR SOLUTION INTRAMUSCULAR; INTRAVENOUS ONCE
Status: COMPLETED | OUTPATIENT
Start: 2024-11-27 | End: 2024-11-27

## 2024-11-27 RX ORDER — ONDANSETRON 2 MG/ML
4 INJECTION INTRAMUSCULAR; INTRAVENOUS
Status: DISCONTINUED | OUTPATIENT
Start: 2024-11-27 | End: 2024-11-27 | Stop reason: HOSPADM

## 2024-11-27 RX ORDER — EPHEDRINE SULFATE 50 MG/ML
5 INJECTION, SOLUTION INTRAVENOUS
Status: DISCONTINUED | OUTPATIENT
Start: 2024-11-27 | End: 2024-11-27 | Stop reason: HOSPADM

## 2024-11-27 RX ORDER — DIPHENHYDRAMINE HYDROCHLORIDE 50 MG/ML
INJECTION INTRAMUSCULAR; INTRAVENOUS PRN
Status: DISCONTINUED | OUTPATIENT
Start: 2024-11-27 | End: 2024-11-27 | Stop reason: SURG

## 2024-11-27 RX ADMIN — MIDAZOLAM HYDROCHLORIDE 2 MG: 1 INJECTION, SOLUTION INTRAMUSCULAR; INTRAVENOUS at 10:59

## 2024-11-27 RX ADMIN — FENTANYL CITRATE 25 MCG: 50 INJECTION, SOLUTION INTRAMUSCULAR; INTRAVENOUS at 12:09

## 2024-11-27 RX ADMIN — FENTANYL CITRATE 50 MCG: 50 INJECTION, SOLUTION INTRAMUSCULAR; INTRAVENOUS at 11:13

## 2024-11-27 RX ADMIN — DIPHENHYDRAMINE HYDROCHLORIDE 12.5 MG: 50 INJECTION, SOLUTION INTRAMUSCULAR; INTRAVENOUS at 11:09

## 2024-11-27 RX ADMIN — PROPOFOL 30 MG: 10 INJECTION, EMULSION INTRAVENOUS at 11:13

## 2024-11-27 RX ADMIN — PROPOFOL 20 MG: 10 INJECTION, EMULSION INTRAVENOUS at 11:47

## 2024-11-27 RX ADMIN — OXYCODONE HYDROCHLORIDE 5 MG: 5 SOLUTION ORAL at 12:09

## 2024-11-27 RX ADMIN — ACETAMINOPHEN 1000 MG: 500 TABLET ORAL at 10:14

## 2024-11-27 RX ADMIN — DEXAMETHASONE SODIUM PHOSPHATE 8 MG: 4 INJECTION INTRA-ARTICULAR; INTRALESIONAL; INTRAMUSCULAR; INTRAVENOUS; SOFT TISSUE at 11:09

## 2024-11-27 RX ADMIN — PROPOFOL 100 MG: 10 INJECTION, EMULSION INTRAVENOUS at 11:03

## 2024-11-27 RX ADMIN — MAGNESIUM SULFATE HEPTAHYDRATE 2 G: 2 INJECTION, SOLUTION INTRAVENOUS at 11:22

## 2024-11-27 RX ADMIN — ONDANSETRON 8 MG: 2 INJECTION INTRAMUSCULAR; INTRAVENOUS at 11:40

## 2024-11-27 RX ADMIN — KETOROLAC TROMETHAMINE 15 MG: 15 INJECTION, SOLUTION INTRAMUSCULAR; INTRAVENOUS at 11:40

## 2024-11-27 RX ADMIN — SODIUM CHLORIDE, POTASSIUM CHLORIDE, SODIUM LACTATE AND CALCIUM CHLORIDE: 600; 310; 30; 20 INJECTION, SOLUTION INTRAVENOUS at 10:56

## 2024-11-27 RX ADMIN — CEFAZOLIN 3 G: 1 INJECTION, POWDER, FOR SOLUTION INTRAMUSCULAR; INTRAVENOUS at 11:09

## 2024-11-27 RX ADMIN — METOCLOPRAMIDE HYDROCHLORIDE 10 MG: 5 INJECTION INTRAMUSCULAR; INTRAVENOUS at 11:09

## 2024-11-27 RX ADMIN — PROPOFOL 20 MG: 10 INJECTION, EMULSION INTRAVENOUS at 11:49

## 2024-11-27 RX ADMIN — FENTANYL CITRATE 50 MCG: 50 INJECTION, SOLUTION INTRAMUSCULAR; INTRAVENOUS at 11:00

## 2024-11-27 RX ADMIN — LIDOCAINE HYDROCHLORIDE 100 MG: 20 INJECTION, SOLUTION EPIDURAL; INFILTRATION; INTRACAUDAL; PERINEURAL at 11:03

## 2024-11-27 RX ADMIN — SCOPOLAMINE 1 PATCH: 1.5 PATCH, EXTENDED RELEASE TRANSDERMAL at 10:15

## 2024-11-27 ASSESSMENT — PAIN DESCRIPTION - PAIN TYPE
TYPE: SURGICAL PAIN

## 2024-11-27 ASSESSMENT — FIBROSIS 4 INDEX: FIB4 SCORE: 1.98

## 2024-11-27 NOTE — ANESTHESIA PROCEDURE NOTES
Airway    Date/Time: 11/27/2024 11:04 AM    Performed by: Lucas Aguillon D.O.  Authorized by: Lucas Aguillon D.O.    Location:  OR  Urgency:  Elective  Indications for Airway Management:  Anesthesia      Spontaneous Ventilation: absent    Sedation Level:  Deep  Preoxygenated: Yes    Patient Position:  Sniffing  MILS Maintained Throughout: No    Mask Difficulty Assessment:  0 - not attempted  Final Airway Type:  Supraglottic airway  Final Supraglottic Airway:  Standard LMA    SGA Size:  4  Number of Attempts at Approach:  1  Ventilation Between Attempts:  None  Number of Other Approaches Attempted:  0

## 2024-11-27 NOTE — H&P
Surgery Orthopedic History & Physical Note    Date  11/27/2024    Primary Care Physician  Jeremías Stevens M.D.    CC  Pre-Op Diagnosis Codes:      * Left carpal tunnel syndrome [G56.02]    HPI  This is a 79 y.o. male who presented with chronic left upper extremity pain, failed conservative treatment expected management, rest, ice, over-the-counter medications and therapy exercises.  He is a straight splint he has numbness and tingling involving all digits and catching locking of his left ring finger affecting activities of daily living with pain ranging from approximately 4-8 out of 10 severity.    Past Medical History:   Diagnosis Date    Anesthesia     Nausea    Arrhythmia     A FIB, RESOLVED SINCE ABLATION    Arthritis     OSTEO    Breath shortness     Cataract     BILAT IOL    Heart murmur     Heart valve disease     TAVR ON 11/2022    Hemorrhagic disorder (HCC)     ELIQUIS    Hypertension     Obesity     PONV (postoperative nausea and vomiting)     Sleep apnea     CPAP       Past Surgical History:   Procedure Laterality Date    ARTHROSCOPY, KNEE      FOOT SURGERY  1980    HAND SURGERY  3/2020    ORIF, ANKLE  1980    ORIF, ELBOW  fracture - [facility]    ORIF, KNEE  right 1/2016  - 5/2015    left 2/2019    OTHER CARDIAC SURGERY  11/23/2022    Aortic Value Replacement    OTHER CARDIAC SURGERY      ablation    OTHER ORTHOPEDIC SURGERY  Many    [place] REMV 2ND CATARACT,CORN-SCLER SECTN      TONSILLECTOMY      UVULOPHARYNGOPALATOPLASTY         Current Facility-Administered Medications   Medication Dose Route Frequency Provider Last Rate Last Admin    scopolamine (Transderm-Scop) patch 1 Patch  1 Patch Transdermal Q72HRS Lucas Aguillon D.O.   1 Patch at 11/27/24 1015    ceFAZolin (Ancef) injection 3 g  3 g Intravenous Once Raul Barajas M.D.           Social History     Socioeconomic History    Marital status:      Spouse name: Not on file    Number of children: Not on file    Years of education: Not on  file    Highest education level: Not on file   Occupational History    Not on file   Tobacco Use    Smoking status: Former     Current packs/day: 0.00     Average packs/day: 0.3 packs/day for 2.0 years (0.5 ttl pk-yrs)     Types: Cigarettes     Start date: 11/15/1966     Quit date: 11/15/1968     Years since quittin.0     Passive exposure: Past    Smokeless tobacco: Never   Vaping Use    Vaping status: Never Used   Substance and Sexual Activity    Alcohol use: Never    Drug use: No    Sexual activity: Not on file   Other Topics Concern    Not on file   Social History Narrative    Not on file     Social Drivers of Health     Financial Resource Strain: Not on file   Food Insecurity: Not on file   Transportation Needs: Not on file   Physical Activity: Not on file   Stress: Not on file   Social Connections: Not on file   Intimate Partner Violence: Not on file   Housing Stability: Not on file       Family History   Problem Relation Age of Onset    Heart Disease Mother          Congestive Heart Failure at 52 years old    Hypertension Mother     Heart Disease Father         Pace Maker    Hypertension Father     Other Brother     Sleep Apnea Brother     Heart Disease Brother         Pace Maker/Defibrillator    Hypertension Brother        Allergies  Hydrochlorothiazide, Sulfa drugs, and Thiazide [hydrochlorothiazide w-triamterene]    Review of Systems  Negative on a 12 point scale    Physical Exam    Vital Signs  Blood Pressure : (!) 193/98   Temperature: 36.6 °C (97.9 °F)   Pulse: 76   Respiration: 18   Pulse Oximetry: 96 %   General:  Well appearing, in no acute distress. Appropriate and cooperative with the exam.  HEENT: Normocephalic, atraumatic. Cranial nerves II-XII are grossly intact.  Cardiovascular: 2+ distal pulses. No cyanosis, clubbing, or edema.  Pulmonary: Breathing comfortably on room air without labor.  Abdomen: Soft and unremarkable.  Skin: No rashes, jaundice, or cyanosis.  Lymphatic: No  epitrochlear lymphadenopathy.  Spine:  Clinically midline.   Gait:  Patient ambulates without a limp.  Musculoskeletal: His left ring finger is tender to palpation A1 pulley with visible and palpable triggering  Neurologic positive Tinel's of the carpal and cubital tunnel    Labs:                    Radiology:  No orders to display         Assessment/Plan:  Pre-Op Diagnosis Codes:      * Left carpal tunnel syndrome [G56.02], cubital tunnel syndrome and ring trigger finger Procedure(s):  LEFT RING FINGER TRIGGER RELEASE, LEFT OPEN CARPAL TUNNEL RELEASE, LEFT CUBITAL TUNNEL RELEASE, LEFT POSSIBLE MEDIAL EPICONDYLECTOMY  LEFT ELBOW CUBITAL TUNNEL RELEASE  RELEASE, CARPAL TUNNEL

## 2024-11-27 NOTE — ANESTHESIA PREPROCEDURE EVALUATION
Case: [id number] Date/Time: 24 1115    Procedures:       LEFT RING FINGER TRIGGER RELEASE, LEFT OPEN CARPAL TUNNEL RELEASE, LEFT CUBITAL TUNNEL RELEASE, LEFT POSSIBLE MEDIAL EPICONDYLECTOMY      LEFT ELBOW CUBITAL TUNNEL RELEASE      RELEASE, CARPAL TUNNEL    Diagnosis: Left carpal tunnel syndrome [G56.02]    Pre-op diagnosis: Left carpal tunnel syndrome [G56.02]    Location: [facility] ROOM 25 / SURGERY SAME DAY [facility]    Surgeons: Raul Barajas M.D.          Anes H&P:  PAST MEDICAL HISTORY:   79 y.o. male who presents for Procedure(s):  LEFT RING FINGER TRIGGER RELEASE, LEFT OPEN CARPAL TUNNEL RELEASE, LEFT CUBITAL TUNNEL RELEASE, LEFT POSSIBLE MEDIAL EPICONDYLECTOMY  LEFT ELBOW CUBITAL TUNNEL RELEASE  RELEASE, CARPAL TUNNEL.  He has current and past medical problems significant for:    Past Medical History:   Diagnosis Date    Anesthesia     Nausea    Arrhythmia     A FIB, RESOLVED SINCE ABLATION    Arthritis     OSTEO    Breath shortness     Cataract     BILAT IOL    Heart murmur     Heart valve disease     TAVR ON 2022    Hemorrhagic disorder (HCC)     ELIQUIS    Hypertension     Obesity     PONV (postoperative nausea and vomiting)     Sleep apnea     CPAP       SMOKING/ALCOHOL/RECREATIONAL DRUG USE:  Social History     Tobacco Use    Smoking status: Former     Current packs/day: 0.00     Average packs/day: 0.3 packs/day for 2.0 years (0.5 ttl pk-yrs)     Types: Cigarettes     Start date: 11/15/1966     Quit date: 11/15/1968     Years since quittin.0     Passive exposure: Past    Smokeless tobacco: Never   Vaping Use    Vaping status: Never Used   Substance Use Topics    Alcohol use: Never    Drug use: No     Social History     Substance and Sexual Activity   Drug Use No       PAST SURGICAL HISTORY:  Past Surgical History:   Procedure Laterality Date    ARTHROSCOPY, KNEE      FOOT SURGERY      HAND SURGERY  3/2020    ORIF, ANKLE      ORIF, ELBOW  fracture - [facility]    ORIF, KNEE   right 1/2016  - 5/2015    left 2/2019    OTHER CARDIAC SURGERY  11/23/2022    Aortic Value Replacement    OTHER CARDIAC SURGERY      ablation    OTHER ORTHOPEDIC SURGERY  Many    [place] REMV 2ND CATARACT,CORN-SCLER SECTN      TONSILLECTOMY      UVULOPHARYNGOPALATOPLASTY         ALLERGIES:   Allergies   Allergen Reactions    Hydrochlorothiazide Unspecified     pancreatitis    Sulfa Drugs Vomiting and Nausea    Thiazide [Hydrochlorothiazide W-Triamterene]      Anything with thiazide causes pancreatitis       MEDICATIONS:  No current facility-administered medications on file prior to encounter.     Current Outpatient Medications on File Prior to Encounter   Medication Sig Dispense Refill    losartan (COZAAR) 25 MG Tab Take 25 mg by mouth every day.       MEDICATION INSTRUCTIONS FOR SURGERY/PROCEDURE SCHEDULED FOR 11/27/24   DO NOT TAKE 24 HOURS PRIOR TO SURGERY      furosemide (LASIX) 40 MG Tab Take 40 mg by mouth every day.     MEDICATION INSTRUCTIONS FOR SURGERY/PROCEDURE SCHEDULED FOR 11/27/24   DO NOT TAKE MORNING OF PROCEDURE.      Ascorbic Acid (VITAMIN C) 1000 MG Tab Take  by mouth.     MEDICATION INSTRUCTIONS FOR SURGERY/PROCEDURE SCHEDULED FOR 11/27/24   DO NOT TAKE 7 DAYS PRIOR TO SURGERY      omeprazole (PRILOSEC) 20 MG delayed-release capsule Take 20 mg by mouth every day.     MEDICATION INSTRUCTIONS FOR SURGERY/PROCEDURE SCHEDULED FOR 11/27/24   OK TO CONTINUE TAKING PRIOR TO SURGERY AND DAY OF SURGERY         LABS:  Lab Results   Component Value Date/Time    HEMOGLOBIN 15.3 08/19/2024 0732    HEMATOCRIT 44.0 08/19/2024 0732    WBC 7.1 08/19/2024 0732     Lab Results   Component Value Date/Time    SODIUM 141 08/19/2024 0732    POTASSIUM 3.9 08/19/2024 0732    CHLORIDE 103 08/19/2024 0732    CO2 26 08/19/2024 0732    GLUCOSE 116 (H) 08/19/2024 0732    BUN 15 08/19/2024 0732    CALCIUM 9.2 08/19/2024 0732         PREVIOUS ANESTHETICS: See EMR  __________________________________________    Relevant Problems    ANESTHESIA   (positive) GERI (obstructive sleep apnea)      CARDIAC   (positive) Atrial fibrillation (HCC)   (positive) Hypertension   (positive) Thoracic aortic aneurysm (TAA) (HCC)      Other   (positive) Anticoagulated   (positive) BMI 45.0-49.9, adult (HCC)   (positive) History of transcatheter aortic valve replacement (TAVR)   (positive) S/P ablation of atrial fibrillation, 8/20/24       Physical Exam    Airway   Mallampati: II  TM distance: >3 FB  Neck ROM: full       Cardiovascular - normal exam  Rhythm: regular  Rate: normal  (+) murmur     Dental - normal exam           Pulmonary - normal exam  Breath sounds clear to auscultation     Abdominal   (+) obese     Neurological - normal exam                   Anesthesia Plan    ASA 3   ASA physical status 3 criteria: a thrombophilic disease requiring anticoagulation and morbid obesity - BMI greater than or equal to 40    Plan - general       Airway plan will be LMA          Induction: intravenous    Postoperative Plan: Postoperative administration of opioids is intended.    Pertinent diagnostic labs and testing reviewed    Informed Consent:    Anesthetic plan and risks discussed with patient.    Use of blood products discussed with: patient whom consented to blood products.

## 2024-11-27 NOTE — ANESTHESIA POSTPROCEDURE EVALUATION
Patient: George Stoll    Procedure Summary       Date: 11/27/24 Room / Location: [facility] ROOM 25 / SURGERY SAME DAY [facility]    Anesthesia Start: 1056 Anesthesia Stop: 1200    Procedures:       LEFT RING FINGER TRIGGER RELEASE, LEFT OPEN CARPAL TUNNEL RELEASE, LEFT CUBITAL TUNNEL RELEASE, LEFT MEDIAL EPICONDYLECTOMY (Left: Hand)      LEFT ELBOW CUBITAL TUNNEL RELEASE (Left: Arm Upper)      RELEASE, CARPAL TUNNEL (Left: Wrist) Diagnosis:       Left carpal tunnel syndrome      (Left carpal tunnel syndrome)    Surgeons: Raul Barajas M.D. Responsible Provider: Lucas Aguillon D.O.    Anesthesia Type: general ASA Status: 3            Final Anesthesia Type: general  Last vitals  BP   Blood Pressure : 129/63    Temp   36.6 °C (97.8 °F)    Pulse   80   Resp   (!) 29    SpO2   92 %      Anesthesia Post Evaluation    Patient location during evaluation: PACU  Patient participation: complete - patient participated  Level of consciousness: awake and alert    Airway patency: patent  Anesthetic complications: no  Cardiovascular status: hemodynamically stable  Respiratory status: acceptable  Hydration status: euvolemic    PONV: none          No notable events documented.     Nurse Pain Score: 4 (NPRS)

## 2024-11-27 NOTE — DISCHARGE INSTRUCTIONS
Keep splint on, clean, and dry until clinic follow-up.  Elevate above heart and ice frequently for at least 2 weeks.   No heavy lifting or grasping for at least 6 weeks.   No driving while on narcotic pain medications. Contact auto-insurance carrier for clearance to begin driving again.   Call MD or come to ER for any major concerns.   Dr Blanco [phone]    What to Expect Post Anesthesia    Rest and take it easy for the first 24 hours.  A responsible adult is recommended to remain with you during that time.  It is normal to feel sleepy.  We encourage you to not do anything that requires balance, judgment or coordination.    FOR 24 HOURS DO NOT:  Drive, operate machinery or run household appliances.  Drink beer or alcoholic beverages.  Make important decisions or sign legal documents.    To avoid nausea, slowly advance diet as tolerated, avoiding spicy or greasy foods for the first day.  Add more substantial food to your diet according to your provider's instructions.  INCREASE FLUIDS AND FIBER TO AVOID CONSTIPATION.    MILD FLU-LIKE SYMPTOMS ARE NORMAL.  YOU MAY EXPERIENCE GENERALIZED MUSCLE ACHES, THROAT IRRITATION, HEADACHE AND/OR SOME NAUSEA.    If any questions arise, call your provider.  If your provider is not available, please feel free to call the Surgical Center at [phone].    MEDICATIONS: Resume taking daily medication.  Take prescribed pain medication with food.  If no medication is prescribed, you may take non-aspirin pain medication if needed.  PAIN MEDICATION CAN BE VERY CONSTIPATING.  Take a stool softener or laxative such as senokot, pericolace, or milk of magnesia if needed.    Last pain medication given at:  Tylenol given at 10:15am, Ok for next dose of tylenol at 4:15pm.   Toradol (IV Nsaid) given at 11:40am, ok for next dose of ibuprofen around 5:40 pm.   Oxycodone given at 12:09 pm.     Keep scopolamine patch in place for 72 hours. Wash hands thoroughly after removing. Ok to  remove patch earlier if you experience disorientation, dizziness, eye pain, heart palpitations, confusion, etc.

## 2024-11-27 NOTE — OR NURSING
1215 report from April RN, assumed pt care. Pt medicated for pain per MAR. Tolerating sips of water.  On 2L NC. VSS.     1220 called pt wife Yaneli to update on recovery. Arm elevated with pillow and cold pack placed.     1243 pt states pain is tolerable at this time.     1254 pt meets phase 2 criteria.     1256 report back to April RN.

## 2024-11-27 NOTE — ANESTHESIA TIME REPORT
Anesthesia Start and Stop Event Times       Date Time Event    11/27/2024 1039 Ready for Procedure     1056 Anesthesia Start     1200 Anesthesia Stop          Responsible Staff  11/27/24      Name Role Begin End    Lucas Aguillon D.O. Anesth 1056 1200          Overtime Reason:  no overtime (within assigned shift)    Comments:

## 2024-11-27 NOTE — LETTER
November 12, 2024    Patient Name: George Stoll  Surgeon Name: Raul Barajas M.D.  Surgery Facility: [facility] ([address] [facility])  Surgery Date: 11/27/2024    The time of your surgery is not final and may change up to and until the day of your surgery. You will be contacted 24-48 hours prior to your surgery date with your check-in and surgery time.    If you will not be at one of the below numbers please call the surgery scheduler at [phone]  Preferred Phone: [phone]    BEFORE YOUR SURGERY   Pre Registration and/or Lab Work must be done within and no earlier than 28 days prior to your surgery date. Your scheduled facility will contact you regarding all required preregistration and/or lab work. If you have not been contacted within 7 days of your scheduled procedure please call [facility] at [phone] for an appointment as soon as possible.    Instructions: Bring a list of all medications you are taking including the dosing and frequency.    DAY OF YOUR SURGERY  Nothing to eat or drink after midnight     Refrain from smoking any substance after midnight prior to surgery. Smoking may interfere with the anesthetic and frequently produces nausea during the recovery period.    Continue taking all lifesaving medications. Including the morning of your surgery with small sip of water.    Please do NOT take on the day of surgery:  Diuretics: examples- furosemide (Lasix), spironolactone, hydrochlorothiazide  ACE-inhibitors: examples- lisinopril, ramipril, enalapril  “ARBs”: examples- losartan, Olmesartan, valsartan    Please arrive at the hospital/surgery center at the check-in time provided.     An adult will need to bring you and take you home after your surgery.     AFTER YOUR SURGERY  Post op Appointment:   Date: 12/12   Time: 245PM   With: Raul Barajas MD   Location: [address] [place] [id number]    - Post Surgery -  You will need someone to drive you home  - You must have someone provide transportation post surgery and someone to monitor you for at least 24 hours post-surgery. If you don't have either of these your appointment will be canceled.     TIME OFF WORK  FMLA or Disability forms can be faxed directly to: [phone] or you may drop them off at [address] [place] [address]. Our office charges a $35.00 fee per form. Forms will be completed within 10 business days of drop off and payment received. For the status of your forms you may contact our disability office directly [phone].    MEDICATION INSTRUCTIONS **Please read section completely**  The following medications should be stopped a minimum of 10 days prior to surgery:  All over the counter, Supplements & Herbal medications    Anorectics: Phentermine (Adipex-P, Lomaira and Suprenza), Phentermine-topiramate (Qsymia), Bupropion-naltrexone (Contrave)    **If you are on Bupropion for anxiety/depression, please continue this medication up until the day of surgery.     Opiod Partial Agonists/Opioid Antagonists: Buprenorphine (Suboxone, Belbuca, Butrans, Probuphine Implant, Sublocade), Naltrexone (ReVia, Vivitrol), Naloxone    Amphetamines: Dextroamphetamine/Amphetamine (Adderall, Mydayis), Methylphenidate Hydrochloride (Concerta, Metadate, Methylin, Ritalin)    The following medications should be stopped 5 days prior to surgery:  Blood Thinners: Any Aspirin, Aspirin products, anti-inflammatories such as ibuprofen and any blood thinners such as Coumadin and Plavix. Please consult your prescribing physician if you are on life saving blood thinners, in regards to when to stop medications prior to surgery.     The following medications should be stopped a minimum of 3 days prior to surgery:  PDE-5 inhibitors: Sildenafil (Viagra), Tadalafil (Cialis), Vardenafil (Levitra), Avanafil (Stendra)    MAO Inhibitors: Rasagiline (Azilect), Selegiline (Eldepryl, Emsam,  Selapar), Isocarboxazid (Marplan), Phenelzine (Nardil)       COVID and INFLUENZA NOTICE TO PATIENTS    Currently, the [facility] does not routinely test patients for COVID-19 or Influenza prior to their elective surgery.  However, if patients develop the following symptoms prior to their surgery date, they should voluntarily test for COVID-19 and Influenza and notify the surgical office of their condition and results.  The symptoms warranting testing would be any two of the following:    Fever (Temp above 100.4 F)  Chills  Cough  Shortness of breath or difficulty breathing  Fatigue  Myalgias (muscle or body aches)  Headache  Sore Throat  Congestion or Runny Nose  Nausea or vomiting  Diarrhea  New loss of taste or smell    Having these symptoms prior to surgery can significantly increase your risk of morbidity and mortality under anesthesia, which may compromise your health and require a transfer to a hospital for a higher level of care.  Therefore, it is advisable to notify the surgical team of any illness in order to get information for the appropriate time to delay the surgery to minimize these preventable risks.    Your health and safety are our number one priority at the [facility], and we are thankful that you entrust us with your care.  Please help us ensure the best possible surgical and anesthetic outcome by sharing appropriate health information with our perioperative team and staff.  We look forward to taking great care of you!    Thank you for your time and consideration on this matter.    Sadi Bradford MD  Medical Director  Anesthesiologist  [facility]

## 2024-11-27 NOTE — OR NURSING
1256- Report from Mara DONALD, care assumed    1305- pt helped to edge of bed. Dressed with assistance of wife    1335- Discharge instructions discussed with pt and pt's wife. All questions answered. Verbalized understanding.    1340- PIV removed with tip intact. Pt escorted out of department in wheelchair with all belongings. Discharged home to responsible adult.

## 2024-11-27 NOTE — LETTER
November 14, 2024    Patient Name: George Stoll  Surgeon Name: Raul Barajas M.D.  Surgery Facility: [facility] ([address] [place] [address])  Surgery Date: 11/27/2024    The time of your surgery is not final and may change up to and until the day of your surgery. You will be contacted 24-48 hours prior to your surgery date with your check-in and surgery time.    If you will not be at one of the below numbers please call the surgery scheduler at [phone]  Preferred Phone: [phone]    BEFORE YOUR SURGERY   Pre Registration and/or Lab Work must be done within and no earlier than 28 days prior to your surgery date. Your scheduled facility will contact you regarding all required preregistration and/or lab work. If you have not been contacted within 7 days of your scheduled procedure please call [facility] at [phone] for an appointment as soon as possible.    Instructions: Bring a list of all medications you are taking including the dosing and frequency.    DAY OF YOUR SURGERY  Nothing to eat or drink after midnight     Refrain from smoking any substance after midnight prior to surgery. Smoking may interfere with the anesthetic and frequently produces nausea during the recovery period.    Continue taking all lifesaving medications. Including the morning of your surgery with small sip of water.    Please do NOT take on the day of surgery:  Diuretics: examples- furosemide (Lasix), spironolactone, hydrochlorothiazide  ACE-inhibitors: examples- lisinopril, ramipril, enalapril  “ARBs”: examples- losartan, Olmesartan, valsartan    Please arrive at the hospital/surgery center at the check-in time provided.     An adult will need to bring you and take you home after your surgery.     AFTER YOUR SURGERY  Post op Appointment:   Date: 12/12   Time: 245PM   With: Raul Barajas MD   Location: [address] [place] [address]    - Post Surgery - You will need someone to  drive you home     TIME OFF WORK  FMLA or Disability forms can be faxed directly to: [phone] or you may drop them off at [address] [place] [address]. Our office charges a $35.00 fee per form. Forms will be completed within 10 business days of drop off and payment received. For the status of your forms you may contact our disability office directly [phone].    MEDICATION INSTRUCTIONS **Please read section completely**  The following medications should be stopped a minimum of 10 days prior to surgery:  All over the counter, Supplements & Herbal medications    Anorectics: Phentermine (Adipex-P, Lomaira and Suprenza), Phentermine-topiramate (Qsymia), Bupropion-naltrexone (Contrave)    **If you are on Bupropion for anxiety/depression, please continue this medication up until the day of surgery.     Opiod Partial Agonists/Opioid Antagonists: Buprenorphine (Suboxone, Belbuca, Butrans, Probuphine Implant, Sublocade), Naltrexone (ReVia, Vivitrol), Naloxone    Amphetamines: Dextroamphetamine/Amphetamine (Adderall, Mydayis), Methylphenidate Hydrochloride (Concerta, Metadate, Methylin, Ritalin)    The following medications should be stopped 5 days prior to surgery:  Blood Thinners: Any Aspirin, Aspirin products, anti-inflammatories such as ibuprofen and any blood thinners such as Coumadin and Plavix. Please consult your prescribing physician if you are on life saving blood thinners, in regards to when to stop medications prior to surgery.     The following medications should be stopped a minimum of 3 days prior to surgery:  PDE-5 inhibitors: Sildenafil (Viagra), Tadalafil (Cialis), Vardenafil (Levitra), Avanafil (Stendra)    MAO Inhibitors: Rasagiline (Azilect), Selegiline (Eldepryl, Emsam, Selapar), Isocarboxazid (Marplan), Phenelzine (Nardil)       COVID and INFLUENZA NOTICE TO PATIENTS    Currently, the [facility] does not routinely test patients for COVID-19 or Influenza prior to  their elective surgery.  However, if patients develop the following symptoms prior to their surgery date, they should voluntarily test for COVID-19 and Influenza and notify the surgical office of their condition and results.  The symptoms warranting testing would be any two of the following:    Fever (Temp above 100.4 F)  Chills  Cough  Shortness of breath or difficulty breathing  Fatigue  Myalgias (muscle or body aches)  Headache  Sore Throat  Congestion or Runny Nose  Nausea or vomiting  Diarrhea  New loss of taste or smell    Having these symptoms prior to surgery can significantly increase your risk of morbidity and mortality under anesthesia, which may compromise your health and require a transfer to a hospital for a higher level of care.  Therefore, it is advisable to notify the surgical team of any illness in order to get information for the appropriate time to delay the surgery to minimize these preventable risks.    Your health and safety are our number one priority at the [facility], and we are thankful that you entrust us with your care.  Please help us ensure the best possible surgical and anesthetic outcome by sharing appropriate health information with our perioperative team and staff.  We look forward to taking great care of you!    Thank you for your time and consideration on this matter.    Sadi Bradford MD  Medical Director  Anesthesiologist  [facility]

## 2024-12-02 NOTE — OP REPORT
Surgeon: Raul Barajas MD    Assistants: None    Preoperative Diagnosis: Left ring finger trigger release, and carpal and cubital tunnel syndrome    Postoperative Diagnosis: Same    Procedure: Left ring finger trigger release, and open carpal tunnel release and cubital tunnel release with medial epicondylectomy    Anesthesia: General     Anesthesiologist: [place] Aguillon MD    Complications: none noted    Drains: None    Specimens: None    Estimated blood loss: Minimal with tourniquet time less than 45 minutes at 250 mmHg.    Indications: George is well-known to me through my outpatient clinic for the above-mentioned diagnosis.  The patient believes and I would agree they have failed conservative treatment and are candidate for the above-mentioned procedure. Prior to the procedure, George understood the risks to include but not be limited to the risk of infection requiring repeat surgery bleeding requiring blood transfusion, nerve blood vessel tendon injury requiring repair, failure to alleviate the symptoms or surgical failure requiring revision surgery or salvage procedure, dissatisfaction with the outcome, unemployment or being fired over the injury or surgery, stiffness, complex regional pain syndrome, acquiring coronavirus its complications, DVT, pulmonary embolism, heart attack, stroke, and even death.  The patient stated despite these risks they wish to proceed with surgery.    Procedure: George was met in the preoperative holding area and the left upper extremity was initialed as the correct operative site.  The patient had opportunity ask questions and all questions were answered and informed consent was obtained.  The patient was brought to the operating room and laid supine on the operating room table.  All bony and dependent prominences well-padded.  General anesthesia was induced out on complication.  Ancef was administered for infection prophylaxis.  The left upper extremity was prepped and draped in  usual sterile fashion.  A formal timeout was performed in which all parties agreed upon the correct patient procedure and operative site.    I began the procedure by using an Esmarch to exsanguinate the extremity and inflated the tourniquet to 250 mmHg.  I made a small longitudinal incision in line with the ring finger over the A1 pulley which I exposed with tenotomy scissors identified and protect neurovascular bundles.  Identified the A1 pulley which released longitudinally in its entirety under direct visualization.  I then made an approximately 4 cm longitudinal incision in line with the ring finger over the carpal tunnel.  I dissected down through the palmar aponeurosis were identified the transverse carpal ligament which I released longitudinally in its entirety under direct visualization as well as the distal pulmonary brachial fascia.  I then made a curvilinear incision over the cubital tunnel.  I dissected down through subcutaneous tissues and identified and protected branches of the medial antebrachial cutaneous nerve.  Identified the ulnar nerve proximally at the level of arcade of [place] which was released and followed distally through Brown's ligament where there is a significant mount of compression.  I released Brown's ligament FCU fascia.  I then took the elbow through range of motion and there was significant subluxation of the ulnar nerve so I therefore elected to perform a medial epicondylectomy.  I did this by splitting the flexor pronator mass in line with the fibers and elevated the periosteum off of the medial epicondyle is osteotome and a rongeur to make a smooth medial epicondylectomy will preserving the deep and distal medial collateral ligament.  I then placed bone wax and copious gated the bone with normal saline and repaired the flexor pronator mass to the periosteum with interrupted 0 Vicryl suture with knots buried.  I took the elbow through range of motion there is no pressure  or tension on the ulnar nerve.  I then deflated the tourniquet and used Bovie cautery to treatment stasis and copiously irrigated the surgical sites with normal saline.  I closed the hand incision with 4-0 nylon suture and closed the elbow incision with 2-0 Monocryl suture and skin stapler, all covered with sterile Xeroform 4 x 4's and well-padded splint.  The patient then awoke from anesthesia without known complication was transferred in a stable condition to the PACU where there were no immediate postoperative complaints.    Postoperative Plan: The patient will be discharged home per same-day criteria sedentary use of the hand and follow-up in clinic in 10 to 14 days for suture removal and wound check.

## 2024-12-05 ENCOUNTER — OFFICE VISIT (OUTPATIENT)
Dept: CARDIOLOGY | Facility: MEDICAL CENTER | Age: 79
End: 2024-12-05
Attending: INTERNAL MEDICINE
Payer: MEDICARE

## 2024-12-05 VITALS
HEIGHT: 67 IN | RESPIRATION RATE: 16 BRPM | SYSTOLIC BLOOD PRESSURE: 126 MMHG | OXYGEN SATURATION: 95 % | DIASTOLIC BLOOD PRESSURE: 84 MMHG | WEIGHT: 301 LBS | HEART RATE: 74 BPM | BODY MASS INDEX: 47.24 KG/M2

## 2024-12-05 DIAGNOSIS — Z86.79 S/P ABLATION OF ATRIAL FIBRILLATION: ICD-10-CM

## 2024-12-05 DIAGNOSIS — Z95.2 HISTORY OF TRANSCATHETER AORTIC VALVE REPLACEMENT (TAVR): ICD-10-CM

## 2024-12-05 DIAGNOSIS — Z98.890 S/P ABLATION OF ATRIAL FIBRILLATION: ICD-10-CM

## 2024-12-05 DIAGNOSIS — G47.33 OSA (OBSTRUCTIVE SLEEP APNEA): ICD-10-CM

## 2024-12-05 DIAGNOSIS — I10 PRIMARY HYPERTENSION: ICD-10-CM

## 2024-12-05 DIAGNOSIS — I48.91 ATRIAL FIBRILLATION, UNSPECIFIED TYPE (HCC): ICD-10-CM

## 2024-12-05 DIAGNOSIS — Z79.01 ANTICOAGULATED: ICD-10-CM

## 2024-12-05 LAB — EKG IMPRESSION: NORMAL

## 2024-12-05 PROCEDURE — 3079F DIAST BP 80-89 MM HG: CPT | Performed by: INTERNAL MEDICINE

## 2024-12-05 PROCEDURE — 3074F SYST BP LT 130 MM HG: CPT | Performed by: INTERNAL MEDICINE

## 2024-12-05 PROCEDURE — 93010 ELECTROCARDIOGRAM REPORT: CPT | Performed by: INTERNAL MEDICINE

## 2024-12-05 PROCEDURE — 93005 ELECTROCARDIOGRAM TRACING: CPT | Mod: TC | Performed by: INTERNAL MEDICINE

## 2024-12-05 PROCEDURE — 99211 OFF/OP EST MAY X REQ PHY/QHP: CPT | Performed by: INTERNAL MEDICINE

## 2024-12-05 PROCEDURE — 99214 OFFICE O/P EST MOD 30 MIN: CPT | Performed by: INTERNAL MEDICINE

## 2024-12-05 ASSESSMENT — FIBROSIS 4 INDEX: FIB4 SCORE: 2.21

## 2024-12-05 NOTE — LETTER
[facility] - Operated by [facility]   [address]  [place] [address]  Phone: [phone]  Fax: [phone]              George Stoll  1945    Encounter Date: 12/5/2024    Luis Alberto Regalado M.D.          PROGRESS NOTE:  No chief complaint on file.      Subjective    George Stoll is a 79 y.o. male who presents today status post ablation for persistent atrial fibrillation.  No recurrence.  Does have a Kardia.  Follows up with Dr. Garcia.  Previous TAVR.  Recent arm surgery.  On DOAC.  Sleep apnea treated.  Obesity.    Past Medical History:   Diagnosis Date   • Anesthesia     Nausea   • Arrhythmia     A FIB, RESOLVED SINCE ABLATION   • Arthritis     OSTEO   • Breath shortness    • Cataract     BILAT IOL   • Heart murmur    • Heart valve disease     TAVR ON 11/2022   • Hemorrhagic disorder (HCC)     ELIQUIS   • Hypertension    • Obesity    • PONV (postoperative nausea and vomiting)    • Sleep apnea     CPAP     Past Surgical History:   Procedure Laterality Date   • TRIGGER FINGER RELEASE Left 11/27/2024    Procedure: LEFT RING FINGER TRIGGER RELEASE, LEFT OPEN CARPAL TUNNEL RELEASE, LEFT CUBITAL TUNNEL RELEASE, LEFT MEDIAL EPICONDYLECTOMY;  Surgeon: Raul Barajas M.D.;  Location: SURGERY SAME DAY [facility];  Service: Orthopedics   • CUBITAL TUNNEL RELEASE Left 11/27/2024    Procedure: LEFT ELBOW CUBITAL TUNNEL RELEASE;  Surgeon: Raul Barajas M.D.;  Location: SURGERY SAME DAY [facility];  Service: Orthopedics   • CARPAL TUNNEL RELEASE Left 11/27/2024    Procedure: RELEASE, CARPAL TUNNEL;  Surgeon: Raul Barajas M.D.;  Location: SURGERY SAME DAY [facility];  Service: Orthopedics   • ARTHROSCOPY, KNEE     • FOOT SURGERY  1980   • HAND SURGERY  3/2020   • ORIF, ANKLE  1980   • ORIF, ELBOW  fracture - Karl   • ORIF, KNEE  right 1/2016  - 5/2015    left 2/2019   • OTHER CARDIAC SURGERY  11/23/2022     Aortic Value Replacement   • OTHER CARDIAC SURGERY      ablation   • OTHER ORTHOPEDIC SURGERY  Many   • MI REMV 2ND CATARACT,CORN-SCLER SECTN     • TONSILLECTOMY     • UVULOPHARYNGOPALATOPLASTY       Family History   Problem Relation Age of Onset   • Heart Disease Mother          Congestive Heart Failure at 52 years old   • Hypertension Mother    • Heart Disease Father         Pace Maker   • Hypertension Father    • Other Brother    • Sleep Apnea Brother    • Heart Disease Brother         Pace Maker/Defibrillator   • Hypertension Brother      Social History     Socioeconomic History   • Marital status:      Spouse name: Not on file   • Number of children: Not on file   • Years of education: Not on file   • Highest education level: Not on file   Occupational History   • Not on file   Tobacco Use   • Smoking status: Former     Current packs/day: 0.00     Average packs/day: 0.3 packs/day for 2.0 years (0.5 ttl pk-yrs)     Types: Cigarettes     Start date: 11/15/1966     Quit date: 11/15/1968     Years since quittin.0     Passive exposure: Past   • Smokeless tobacco: Never   Vaping Use   • Vaping status: Never Used   Substance and Sexual Activity   • Alcohol use: Never   • Drug use: No   • Sexual activity: Not on file   Other Topics Concern   • Not on file   Social History Narrative   • Not on file     Social Drivers of Health     Financial Resource Strain: Not on file   Food Insecurity: Not on file   Transportation Needs: Not on file   Physical Activity: Not on file   Stress: Not on file   Social Connections: Not on file   Intimate Partner Violence: Not on file   Housing Stability: Not on file     Allergies   Allergen Reactions   • Hydrochlorothiazide Unspecified     pancreatitis   • Sulfa Drugs Vomiting and Nausea   • Thiazide [Hydrochlorothiazide W-Triamterene]      Anything with thiazide causes pancreatitis     Outpatient Encounter Medications as of 2024   Medication Sig Dispense Refill   •  "apixaban (ELIQUIS) 5mg Tab Take 5 mg by mouth 2 times a day.       FOR SURGERY ON 11/27/24: FOLLOWING INSTRUCTIONS FROM DR GROSSMAN, CARDIOLOGIST     • losartan (COZAAR) 25 MG Tab Take 25 mg by mouth every day.       MEDICATION INSTRUCTIONS FOR SURGERY/PROCEDURE SCHEDULED FOR 11/27/24   DO NOT TAKE 24 HOURS PRIOR TO SURGERY     • furosemide (LASIX) 40 MG Tab Take 40 mg by mouth every day.     MEDICATION INSTRUCTIONS FOR SURGERY/PROCEDURE SCHEDULED FOR 11/27/24   DO NOT TAKE MORNING OF PROCEDURE.     • Ascorbic Acid (VITAMIN C) 1000 MG Tab Take  by mouth.     MEDICATION INSTRUCTIONS FOR SURGERY/PROCEDURE SCHEDULED FOR 11/27/24   DO NOT TAKE 7 DAYS PRIOR TO SURGERY     • omeprazole (PRILOSEC) 20 MG delayed-release capsule Take 20 mg by mouth every day.     MEDICATION INSTRUCTIONS FOR SURGERY/PROCEDURE SCHEDULED FOR 11/27/24   OK TO CONTINUE TAKING PRIOR TO SURGERY AND DAY OF SURGERY       No facility-administered encounter medications on file as of 12/5/2024.     ROS           Objective    /84 (BP Location: Left arm, Patient Position: Sitting, BP Cuff Size: Adult)   Pulse 74   Resp 16   Ht 1.702 m (5' 7\")   Wt (!) 137 kg (301 lb)   SpO2 95%   BMI 47.14 kg/m²     Physical Exam  Constitutional:       Appearance: He is well-developed. He is obese.   HENT:      Head: Normocephalic and atraumatic.   Cardiovascular:      Rate and Rhythm: Normal rate and regular rhythm.      Heart sounds: No murmur heard.     No friction rub. No gallop.   Pulmonary:      Effort: Pulmonary effort is normal.      Breath sounds: Normal breath sounds.   Abdominal:      Palpations: Abdomen is soft.   Musculoskeletal:         General: Normal range of motion.      Cervical back: Normal range of motion and neck supple.   Skin:     General: Skin is warm and dry.   Neurological:      Mental Status: He is alert and oriented to person, place, and time.   Psychiatric:         Behavior: Behavior normal.         Thought Content: Thought " content normal.         Judgment: Judgment normal.                Assessment & Plan    1. Atrial fibrillation, unspecified type (HCC)  EKG      2. S/P ablation of atrial fibrillation, 8/20/24        3. History of transcatheter aortic valve replacement (TAVR)        4. Anticoagulated        5. GERI (obstructive sleep apnea)        6. BMI 45.0-49.9, adult ([facility])        7. Primary hypertension            Medical Decision Making: Today's Assessment/Status/Plan:   1.  Atrial fibrillation persistent status post ablation no recurrence.  On no antiarrhythmics.  2.  Status post TAVR   3.  Obesity work on weight loss.  4.  Sleep apnea treated.  5.  Continue DOAC.  6.  Follow-up with general cardiology.  Can follow-up with me once a year if wishes.                             Jeremías Stevens M.D.  [address] [place] [id number]  Via Fax: [phone]    Alessio Garcia D.O.  [address] [facility] [address]  Via Fax: [phone]

## 2024-12-05 NOTE — PROGRESS NOTES
No chief complaint on file.      Subjective     George Stoll is a 79 y.o. male who presents today status post ablation for persistent atrial fibrillation.  No recurrence.  Does have a Kardia.  Follows up with Dr. Garcia.  Previous TAVR.  Recent arm surgery.  On DOAC.  Sleep apnea treated.  Obesity.    Past Medical History:   Diagnosis Date    Anesthesia     Nausea    Arrhythmia     A FIB, RESOLVED SINCE ABLATION    Arthritis     OSTEO    Breath shortness     Cataract     BILAT IOL    Heart murmur     Heart valve disease     TAVR ON 2022    Hemorrhagic disorder (HCC)     ELIQUIS    Hypertension     Obesity     PONV (postoperative nausea and vomiting)     Sleep apnea     CPAP     Past Surgical History:   Procedure Laterality Date    TRIGGER FINGER RELEASE Left 2024    Procedure: LEFT RING FINGER TRIGGER RELEASE, LEFT OPEN CARPAL TUNNEL RELEASE, LEFT CUBITAL TUNNEL RELEASE, LEFT MEDIAL EPICONDYLECTOMY;  Surgeon: Raul Barajas M.D.;  Location: SURGERY SAME DAY [facility];  Service: Orthopedics    CUBITAL TUNNEL RELEASE Left 2024    Procedure: LEFT ELBOW CUBITAL TUNNEL RELEASE;  Surgeon: Raul Barajas M.D.;  Location: SURGERY SAME DAY [facility];  Service: Orthopedics    CARPAL TUNNEL RELEASE Left 2024    Procedure: RELEASE, CARPAL TUNNEL;  Surgeon: Raul Barajas M.D.;  Location: SURGERY SAME DAY [facility];  Service: Orthopedics    ARTHROSCOPY, KNEE      FOOT SURGERY      HAND SURGERY  3/2020    ORIF, ANKLE  1980    ORIF, ELBOW  fracture - Karl    ORIF, KNEE  right 2016  - 2015    left 2019    OTHER CARDIAC SURGERY  2022    Aortic Value Replacement    OTHER CARDIAC SURGERY      ablation    OTHER ORTHOPEDIC SURGERY  Many    PA REMV 2ND CATARACT,CORN-SCLER SECTN      TONSILLECTOMY      UVULOPHARYNGOPALATOPLASTY       Family History   Problem Relation Age of Onset    Heart Disease Mother          Congestive Heart Failure at 52 years old     Hypertension Mother     Heart Disease Father         Pace Maker    Hypertension Father     Other Brother     Sleep Apnea Brother     Heart Disease Brother         Pace Maker/Defibrillator    Hypertension Brother      Social History     Socioeconomic History    Marital status:      Spouse name: Not on file    Number of children: Not on file    Years of education: Not on file    Highest education level: Not on file   Occupational History    Not on file   Tobacco Use    Smoking status: Former     Current packs/day: 0.00     Average packs/day: 0.3 packs/day for 2.0 years (0.5 ttl pk-yrs)     Types: Cigarettes     Start date: 11/15/1966     Quit date: 11/15/1968     Years since quittin.0     Passive exposure: Past    Smokeless tobacco: Never   Vaping Use    Vaping status: Never Used   Substance and Sexual Activity    Alcohol use: Never    Drug use: No    Sexual activity: Not on file   Other Topics Concern    Not on file   Social History Narrative    Not on file     Social Drivers of Health     Financial Resource Strain: Not on file   Food Insecurity: Not on file   Transportation Needs: Not on file   Physical Activity: Not on file   Stress: Not on file   Social Connections: Not on file   Intimate Partner Violence: Not on file   Housing Stability: Not on file     Allergies   Allergen Reactions    Hydrochlorothiazide Unspecified     pancreatitis    Sulfa Drugs Vomiting and Nausea    Thiazide [Hydrochlorothiazide W-Triamterene]      Anything with thiazide causes pancreatitis     Outpatient Encounter Medications as of 2024   Medication Sig Dispense Refill    apixaban (ELIQUIS) 5mg Tab Take 5 mg by mouth 2 times a day.       FOR SURGERY ON 24: FOLLOWING INSTRUCTIONS FROM DR GROSSMAN, CARDIOLOGIST      losartan (COZAAR) 25 MG Tab Take 25 mg by mouth every day.       MEDICATION INSTRUCTIONS FOR SURGERY/PROCEDURE SCHEDULED FOR 24   DO NOT TAKE 24 HOURS PRIOR TO SURGERY      furosemide (LASIX) 40 MG  "Tab Take 40 mg by mouth every day.     MEDICATION INSTRUCTIONS FOR SURGERY/PROCEDURE SCHEDULED FOR 11/27/24   DO NOT TAKE MORNING OF PROCEDURE.      Ascorbic Acid (VITAMIN C) 1000 MG Tab Take  by mouth.     MEDICATION INSTRUCTIONS FOR SURGERY/PROCEDURE SCHEDULED FOR 11/27/24   DO NOT TAKE 7 DAYS PRIOR TO SURGERY      omeprazole (PRILOSEC) 20 MG delayed-release capsule Take 20 mg by mouth every day.     MEDICATION INSTRUCTIONS FOR SURGERY/PROCEDURE SCHEDULED FOR 11/27/24   OK TO CONTINUE TAKING PRIOR TO SURGERY AND DAY OF SURGERY       No facility-administered encounter medications on file as of 12/5/2024.     ROS           Objective     /84 (BP Location: Left arm, Patient Position: Sitting, BP Cuff Size: Adult)   Pulse 74   Resp 16   Ht 1.702 m (5' 7\")   Wt (!) 137 kg (301 lb)   SpO2 95%   BMI 47.14 kg/m²     Physical Exam  Constitutional:       Appearance: He is well-developed. He is obese.   HENT:      Head: Normocephalic and atraumatic.   Cardiovascular:      Rate and Rhythm: Normal rate and regular rhythm.      Heart sounds: No murmur heard.     No friction rub. No gallop.   Pulmonary:      Effort: Pulmonary effort is normal.      Breath sounds: Normal breath sounds.   Abdominal:      Palpations: Abdomen is soft.   Musculoskeletal:         General: Normal range of motion.      Cervical back: Normal range of motion and neck supple.   Skin:     General: Skin is warm and dry.   Neurological:      Mental Status: He is alert and oriented to person, place, and time.   Psychiatric:         Behavior: Behavior normal.         Thought Content: Thought content normal.         Judgment: Judgment normal.                Assessment & Plan     1. Atrial fibrillation, unspecified type (HCC)  EKG      2. S/P ablation of atrial fibrillation, 8/20/24        3. History of transcatheter aortic valve replacement (TAVR)        4. Anticoagulated        5. GERI (obstructive sleep apnea)        6. BMI 45.0-49.9, adult ([facility])   "      7. Primary hypertension            Medical Decision Making: Today's Assessment/Status/Plan:   1.  Atrial fibrillation persistent status post ablation no recurrence.  On no antiarrhythmics.  2.  Status post TAVR   3.  Obesity work on weight loss.  4.  Sleep apnea treated.  5.  Continue DOAC.  6.  Follow-up with general cardiology.  Can follow-up with me once a year if wishes.

## 2025-04-07 PROBLEM — M65.312 TRIGGER THUMB OF LEFT HAND: Status: ACTIVE | Noted: 2025-04-07

## 2025-07-24 ENCOUNTER — OFFICE VISIT (OUTPATIENT)
Dept: SLEEP MEDICINE | Facility: MEDICAL CENTER | Age: 80
End: 2025-07-24
Attending: NURSE PRACTITIONER
Payer: MEDICARE

## 2025-07-24 VITALS
RESPIRATION RATE: 16 BRPM | WEIGHT: 291 LBS | HEIGHT: 68 IN | HEART RATE: 74 BPM | OXYGEN SATURATION: 95 % | DIASTOLIC BLOOD PRESSURE: 74 MMHG | SYSTOLIC BLOOD PRESSURE: 120 MMHG | BODY MASS INDEX: 44.1 KG/M2

## 2025-07-24 DIAGNOSIS — Z87.891 FORMER SMOKER: ICD-10-CM

## 2025-07-24 DIAGNOSIS — G47.33 OBSTRUCTIVE SLEEP APNEA: Primary | ICD-10-CM

## 2025-07-24 PROCEDURE — 99214 OFFICE O/P EST MOD 30 MIN: CPT | Performed by: NURSE PRACTITIONER

## 2025-07-24 PROCEDURE — 99212 OFFICE O/P EST SF 10 MIN: CPT | Performed by: NURSE PRACTITIONER

## 2025-07-24 PROCEDURE — 3074F SYST BP LT 130 MM HG: CPT | Performed by: NURSE PRACTITIONER

## 2025-07-24 PROCEDURE — 3078F DIAST BP <80 MM HG: CPT | Performed by: NURSE PRACTITIONER

## 2025-07-24 ASSESSMENT — FIBROSIS 4 INDEX: FIB4 SCORE: 2.24

## 2025-07-24 ASSESSMENT — PATIENT HEALTH QUESTIONNAIRE - PHQ9: CLINICAL INTERPRETATION OF PHQ2 SCORE: 0

## 2025-07-24 NOTE — PROGRESS NOTES
Chief Complaint   Patient presents with    Follow-Up     SLEEP - ESTABLISHED PT CHART PREP COMPLETED ON 7/23/25      Last Office Visit 10/29/24 with Jennifer Arreaga     DME: Cpap & More     PAP/O2/OAT: CPAP 15 cm    Test completed: OPO 10/22/24 in media     Wireless Data? YES- [employer] - Compliance uploaded   Setup date: 7/26/24        HPI:  George Stoll is a 80 y.o. year old male here today for follow-up on GERI.  Last OV 10/29/24     Currently using CPAP @ 15cm H20 nightly; RESMED; device obtained 7/26/24.   Compliance report 6/23/2025 through 7/22/2025 indicates 100% compliance, average nightly use 8 hours, minimal mask leak with overall AHI of 0.5/h.  Reviewed with patient.    Interval Events:  7/24/25: He returns today with no complaints.  He has been monitoring his oxygen levels with his watch and they have been over 90%.  He is playing tennis 4 times a week.  He notes having neuropathy but not slowing him down.  He will sometimes take a nap in his chair once every other week for no longer than an hour.  He continues to tolerate mask and pressure well.  10/29/24: He notes undergoing an ablation 2 months ago with success. He is no longer on Eliquis. He has no complaints about sleep and finds he continues to benefit from therapy. He goes to bed 9:30 PM fall asleep quickly but generally wakes 2 times a night to urinate resume sleep till 6 AM. No morning headaches or shortness of breath.     Sleep hx:  To reiterate, patient has a long-standing history of sleep apnea initially diagnosed in 2005, RDI at that time was 17, minimum saturation 72%.  He has been compliant with CPAP for many years.  He is currently using CPAP 15 cm H2O.  Device obtained 2019.     Echo 8/8/2022 indicated EF 70%, grade 1 diastolic dysfunction, severe left atrial enlargement, mild right atrial enlargement, small to right PFO shunt, sinus of Valsalva dilated 4.2 cm, ascending aorta 4.2 cm, RVSP 31 mmHg.  Compared to 8/11/2021 study slight  increase in aortic valve velocity and gradients.  Moderate aortic stenosis and now severe left atrial stenosis present.     DME [facility] 2022 indicates mean SPO2 91.3%, O2 ely 87%, less than 88% for 32 seconds of the night.  Overall adequate oxygenation.  Continue current settings.      Hx of heart valve replacement in .      ROS: As per HPI and otherwise negative if not stated.    Past Medical History[1]    Past Surgical History[2]    Family History   Problem Relation Age of Onset    Heart Disease Mother          Congestive Heart Failure at 52 years old    Hypertension Mother     Heart Disease Father         Pace Maker    Hypertension Father     Other Brother     Sleep Apnea Brother     Heart Disease Brother         Pace Maker/Defibrillator    Hypertension Brother        Social History     Socioeconomic History    Marital status:      Spouse name: Not on file    Number of children: Not on file    Years of education: Not on file    Highest education level: Not on file   Occupational History    Not on file   Tobacco Use    Smoking status: Former     Current packs/day: 0.00     Average packs/day: 0.3 packs/day for 2.0 years (0.5 ttl pk-yrs)     Types: Cigarettes     Start date: 11/15/1966     Quit date: 11/15/1968     Years since quittin.7     Passive exposure: Past    Smokeless tobacco: Never   Vaping Use    Vaping status: Never Used   Substance and Sexual Activity    Alcohol use: Never    Drug use: No    Sexual activity: Not on file   Other Topics Concern    Not on file   Social History Narrative    Not on file     Social Drivers of Health     Financial Resource Strain: Not on file   Food Insecurity: Not on file   Transportation Needs: Not on file   Physical Activity: Not on file   Stress: Not on file   Social Connections: Not on file   Intimate Partner Violence: Not on file   Housing Stability: Not on file       Allergies as of 2025 - Reviewed 2025   Allergen Reaction Noted     "Hydrochlorothiazide Unspecified 01/20/2016    Sulfa drugs Vomiting and Nausea 05/24/2012    Thiazide [hydrochlorothiazide w-triamterene]  05/24/2012        Vitals:  /74 (BP Location: Left arm, Patient Position: Sitting, BP Cuff Size: Large adult)   Pulse 74   Resp 16   Ht 1.727 m (5' 8\")   Wt (!) 132 kg (291 lb)   SpO2 95%     Current medications as of today Current Medications[3]      Physical Exam:   Gen:           Alert and oriented, No apparent distress. Mood and affect appropriate, normal interaction with examiner.  Eyes:          PERRL, EOM intact, sclere white, conjunctive moist.  Ears:          Not examined.   Hearing:     Grossly intact.  Nose:          Normal, no lesions or deformities.  Dentition:    Not examined.   Oropharynx:   Not examined.   Mallampati Classification: Not examined.   Neck:        Supple, trachea midline, no masses.  Respiratory Effort: No intercostal retractions or use of accessory muscles.   Lung Auscultation:      Clear to auscultation bilaterally; no rales, rhonchi or wheezing.  CV:            Regular rate and rhythm. No murmurs, rubs or gallops.  Abd:           Not examined.   Lymphadenopathy: Not examined.  Gait and Station: Normal.  Digits and Nails: No clubbing, cyanosis, petechiae, or nodes.   Cranial Nerves: II-XII grossly intact.  Skin:        No rashes, lesions or ulcers noted.               Ext:           No cyanosis or edema.      Assessment:  1. Obstructive sleep apnea  DME Mask and Supplies      2. BMI 40.0-44.9, adult ([facility])  HEIGHT AND WEIGHT      3. Former smoker            Immunizations:    Flu:recommend fall 2025  PCV 20: 2023  COVID-19: 2022    Plan:  GERI is well treated.  He will continue to benefit from nightly use.   DME mask/supplies  Encouraged ongoing weight loss or healthy diet and activity.  Follow-up in 1 year with compliance report, sooner if needed.    Please note that this dictation was created using voice recognition software. I have made " every reasonable attempt to correct obvious errors, but it is possible there are errors of grammar and possibly content that I did not discover before finalizing the note.         [1]   Past Medical History:  Diagnosis Date    Anesthesia     Nausea    Arrhythmia     A FIB, RESOLVED SINCE ABLATION    Arthritis     OSTEO    Breath shortness     Cataract     BILAT IOL    Heart murmur     Heart valve disease     TAVR ON 11/2022    Hemorrhagic disorder (HCC)     ELIQUIS    Hypertension     Obesity     PONV (postoperative nausea and vomiting)     Sleep apnea     CPAP   [2]   Past Surgical History:  Procedure Laterality Date    TRIGGER FINGER RELEASE Left 11/27/2024    Procedure: LEFT RING FINGER TRIGGER RELEASE, LEFT OPEN CARPAL TUNNEL RELEASE, LEFT CUBITAL TUNNEL RELEASE, LEFT MEDIAL EPICONDYLECTOMY;  Surgeon: Raul Barajas M.D.;  Location: SURGERY SAME DAY [facility];  Service: Orthopedics    CUBITAL TUNNEL RELEASE Left 11/27/2024    Procedure: LEFT ELBOW CUBITAL TUNNEL RELEASE;  Surgeon: Raul Barajas M.D.;  Location: SURGERY SAME DAY [facility];  Service: Orthopedics    CARPAL TUNNEL RELEASE Left 11/27/2024    Procedure: RELEASE, CARPAL TUNNEL;  Surgeon: Raul Barajas M.D.;  Location: SURGERY SAME DAY [facility];  Service: Orthopedics    ARTHROSCOPY, KNEE      FOOT SURGERY  1980    HAND SURGERY  3/2020    ORIF, ANKLE  1980    ORIF, ELBOW  fracture - Karl    ORIF, KNEE  right 1/2016  - 5/2015    left 2/2019    OTHER CARDIAC SURGERY  11/23/2022    Aortic Value Replacement    OTHER CARDIAC SURGERY      ablation    OTHER ORTHOPEDIC SURGERY  Many    DE REMV 2ND CATARACT,CORN-SCLER SECTN      TONSILLECTOMY      UVULOPHARYNGOPALATOPLASTY     [3]   Current Outpatient Medications   Medication Sig Dispense Refill    apixaban (ELIQUIS) 5mg Tab Take 5 mg by mouth 2 times a day.       FOR SURGERY ON 11/27/24: FOLLOWING INSTRUCTIONS FROM DR GROSSMAN, CARDIOLOGIST      losartan (COZAAR) 25 MG Tab Take 25 mg  by mouth every day.       MEDICATION INSTRUCTIONS FOR SURGERY/PROCEDURE SCHEDULED FOR 11/27/24   DO NOT TAKE 24 HOURS PRIOR TO SURGERY      furosemide (LASIX) 40 MG Tab Take 40 mg by mouth every day.     MEDICATION INSTRUCTIONS FOR SURGERY/PROCEDURE SCHEDULED FOR 11/27/24   DO NOT TAKE MORNING OF PROCEDURE.      Ascorbic Acid (VITAMIN C) 1000 MG Tab Take  by mouth.     MEDICATION INSTRUCTIONS FOR SURGERY/PROCEDURE SCHEDULED FOR 11/27/24   DO NOT TAKE 7 DAYS PRIOR TO SURGERY      omeprazole (PRILOSEC) 20 MG delayed-release capsule Take 20 mg by mouth every day.     MEDICATION INSTRUCTIONS FOR SURGERY/PROCEDURE SCHEDULED FOR 11/27/24   OK TO CONTINUE TAKING PRIOR TO SURGERY AND DAY OF SURGERY       No current facility-administered medications for this visit.      none

## (undated) DEVICE — GLOVE BIOGEL PI INDICATOR SZ 7.0 SURGICAL PF LF - (50/BX 4BX/CA)

## (undated) DEVICE — SENSOR OXIMETER ADULT SPO2 RD SET (20EA/BX)

## (undated) DEVICE — GLOVE BIOGEL PI INDICATOR SZ 6.5 SURGICAL PF LF - (50/BX 4BX/CA)

## (undated) DEVICE — CANNULA O2 COMFORT SOFT EAR ADULT 7 FT TUBING (50/CA)

## (undated) DEVICE — GLOVE BIOGEL SZ 7.5 SURGICAL PF LTX - (50PR/BX 4BX/CA)

## (undated) DEVICE — PADDING CAST 4 IN STERILE - 4 X 4 YDS (24/CA)

## (undated) DEVICE — GLOVE SZ 7 BIOGEL PI MICRO - PF LF (50PR/BX 4BX/CA)

## (undated) DEVICE — SUTURE 4-0 ETHILON FS-2 18 (36PK/BX)"

## (undated) DEVICE — CLOSURE SKIN STRIP 1/2 X 4 IN - (STERI STRIP) (50/BX 4BX/CA)

## (undated) DEVICE — GLOVE BIOGEL INDICATOR SZ 8 SURGICAL PF LTX - (50/BX 4BX/CA)

## (undated) DEVICE — TUBING CLEARLINK DUO-VENT - C-FLO (48EA/CA)

## (undated) DEVICE — TUBE CONNECTING SUCTION - CLEAR PLASTIC STERILE 72 IN (50EA/CA)

## (undated) DEVICE — GOWN WARMING STANDARD FLEX - (30/CA)

## (undated) DEVICE — GLOVE BIOGEL PI INDICATOR SZ 8.0 SURGICAL PF LF -(50/BX 4BX/CA)

## (undated) DEVICE — TOWEL STOP TIMEOUT SAFETY FLAG (40EA/CA)

## (undated) DEVICE — LACTATED RINGERS INJ 1000 ML - (14EA/CA 60CA/PF)

## (undated) DEVICE — PADDING CAST 4 IN X 4 YDS - SOF-ROLL (12RL/BG 6BG/CT)

## (undated) DEVICE — COVER LIGHT HANDLE FLEXIBLE - SOFT (2EA/PK 80PK/CA)

## (undated) DEVICE — WATER IRRIGATION STERILE 1000ML (12EA/CA)

## (undated) DEVICE — ELECTRODE DUAL RETURN W/ CORD - (50/PK)

## (undated) DEVICE — KIT  I.V. START (100EA/CA)

## (undated) DEVICE — MASK OXYGEN VNYL ADLT MED CONC WITH 7 FOOT TUBING - (50EA/CA)

## (undated) DEVICE — STOCKINET BIAS 6 IN STERILE - (20/CA)

## (undated) DEVICE — CANISTER SUCTION RIGID RED 1500CC (40EA/CA)

## (undated) DEVICE — SLEEVE VASO DVT COMPRESSION CALF MED - (10PR/CA)

## (undated) DEVICE — STAPLER SKIN DISP - (6/BX 10BX/CA) VISISTAT

## (undated) DEVICE — STOCKINET BIAS 4 IN STERILE - (20/CA)

## (undated) DEVICE — SODIUM CHL IRRIGATION 0.9% 1000ML (12EA/CA)

## (undated) DEVICE — PAD PREP 24 X 48 CUFFED - (100/CA)

## (undated) DEVICE — SET LEADWIRE 5 LEAD BEDSIDE DISPOSABLE ECG (1SET OF 5/EA)

## (undated) DEVICE — CANISTER SUCTION 3000ML MECHANICAL FILTER AUTO SHUTOFF MEDI-VAC NONSTERILE LF DISP (40EA/CA)

## (undated) DEVICE — SYRINGE 10 ML CONTROL LL (25EA/BX 4BX/CA)

## (undated) DEVICE — PADDING CAST 6 IN STERILE - 6 X 4 YDS (24/CA)

## (undated) DEVICE — GLOVE SZ 7.5 BIOGEL PI MICRO - PF LF (50PR/BX)

## (undated) DEVICE — SUTURE 0 VICRYL PLUS CT-1 - 8 X 18 INCH (12/BX)

## (undated) DEVICE — PACK UPPER EXTREMITY (2EA/CA)

## (undated) DEVICE — SUTURE GENERAL

## (undated) DEVICE — ADHESIVE MASTISOL - (48/BX)

## (undated) DEVICE — SUCTION INSTRUMENT YANKAUER BULBOUS TIP W/O VENT (50EA/CA)